# Patient Record
Sex: FEMALE | Race: WHITE | NOT HISPANIC OR LATINO | Employment: UNEMPLOYED | URBAN - METROPOLITAN AREA
[De-identification: names, ages, dates, MRNs, and addresses within clinical notes are randomized per-mention and may not be internally consistent; named-entity substitution may affect disease eponyms.]

---

## 2017-05-03 NOTE — TELEPHONE ENCOUNTER
----- Message from Amelia Clark sent at 5/3/2017  2:39 PM CDT -----  Pt states she wanted to let the office know she has an apt.          Please call pt back at [phone]

## 2017-05-03 NOTE — TELEPHONE ENCOUNTER
----- Message from Isela Baumann sent at 5/3/2017  2:03 PM CDT -----  Contact: patient  Calling concerning renewal for Verapamil 360 mg and Xanax. Please call patient @ [phone]. Thanks, anna Ashton   [address] [place] [address]   # [phone]

## 2017-05-03 NOTE — TELEPHONE ENCOUNTER
----- Message from Lizz French sent at 5/3/2017  4:45 PM CDT -----  returned call..[phone] (home)

## 2017-05-03 NOTE — TELEPHONE ENCOUNTER
Returned call to pt regarding Rx request. No answer. Advised via voicemail that she is overdue for an appointment, and should call the appointment desk to schedule.//rlt

## 2017-05-04 RX ORDER — VERAPAMIL HYDROCHLORIDE 360 MG/1
CAPSULE, DELAYED RELEASE PELLETS ORAL
Qty: 180 CAPSULE | Refills: 3 | Status: SHIPPED | OUTPATIENT
Start: 2017-05-04 | End: 2017-05-10

## 2017-05-04 RX ORDER — ALPRAZOLAM 0.5 MG/1
0.5 TABLET ORAL 3 TIMES DAILY
Qty: 60 TABLET | Refills: 0 | Status: SHIPPED | OUTPATIENT
Start: 2017-05-04 | End: 2017-05-04 | Stop reason: SDUPTHER

## 2017-05-04 RX ORDER — VERAPAMIL HYDROCHLORIDE 360 MG/1
CAPSULE, DELAYED RELEASE PELLETS ORAL
Qty: 180 CAPSULE | Refills: 3 | Status: SHIPPED | OUTPATIENT
Start: 2017-05-04 | End: 2017-05-23 | Stop reason: CLARIF

## 2017-05-04 RX ORDER — ALPRAZOLAM 0.5 MG/1
0.5 TABLET ORAL 3 TIMES DAILY
Qty: 60 TABLET | Refills: 0 | Status: SHIPPED | OUTPATIENT
Start: 2017-05-04 | End: 2017-05-23 | Stop reason: SDUPTHER

## 2017-05-04 NOTE — TELEPHONE ENCOUNTER
----- Message from Isela Baumann sent at 5/4/2017  9:22 AM CDT -----  Contact: patient  Calling concerning if RX for BP medication and Xanax medication was called in to pharmacy.    [facility] [address] [place] [address]  [address] [place] [address]  Phone: [phone] Fax: [phone]    Please call patient ASAP @ [phone]. Thanks, anna

## 2017-05-04 NOTE — TELEPHONE ENCOUNTER
Returned call to pt regarding med refills. Appt scheduled 5/10/17. Requesting Verapamil/Xanax refill, and a phone call once Rx has been sent. No longer has insurance.

## 2017-05-04 NOTE — TELEPHONE ENCOUNTER
----- Message from Isela Baumann sent at 5/4/2017  9:22 AM CDT -----  Contact: patient  Calling concerning if RX for BP medication and Xanax medication was called in to pharmacy.    [facility] - [place] [address]  [address] [place] [address]  Phone: [phone] Fax: [phone]    Please call patient ASAP @ [phone]. Thanks, anna

## 2017-05-05 ENCOUNTER — TELEPHONE (OUTPATIENT)
Dept: INTERNAL MEDICINE | Facility: CLINIC | Age: 57
End: 2017-05-05

## 2017-05-05 NOTE — TELEPHONE ENCOUNTER
I called the pt back about her appt on wed 05/10/17 and I asked was there a problem with her appt scheduled she stated no . She needs her handicap plaque renewed . I informed her I will let the physician know that she called to request a new handicap plaque.

## 2017-05-10 ENCOUNTER — OFFICE VISIT (OUTPATIENT)
Dept: INTERNAL MEDICINE | Facility: CLINIC | Age: 57
End: 2017-05-10
Payer: MEDICAID

## 2017-05-10 VITALS
OXYGEN SATURATION: 99 % | HEIGHT: 66 IN | HEART RATE: 94 BPM | TEMPERATURE: 97 F | WEIGHT: 97.44 LBS | BODY MASS INDEX: 15.66 KG/M2 | SYSTOLIC BLOOD PRESSURE: 132 MMHG | DIASTOLIC BLOOD PRESSURE: 85 MMHG

## 2017-05-10 DIAGNOSIS — M15.9 PRIMARY OSTEOARTHRITIS INVOLVING MULTIPLE JOINTS: ICD-10-CM

## 2017-05-10 DIAGNOSIS — F17.200 TOBACCO DEPENDENCE: ICD-10-CM

## 2017-05-10 DIAGNOSIS — M81.0 AGE-RELATED OSTEOPOROSIS WITHOUT CURRENT PATHOLOGICAL FRACTURE: ICD-10-CM

## 2017-05-10 DIAGNOSIS — Z00.00 WELL ADULT EXAM: Primary | ICD-10-CM

## 2017-05-10 DIAGNOSIS — I10 ESSENTIAL HYPERTENSION: ICD-10-CM

## 2017-05-10 DIAGNOSIS — F41.9 ANXIETY: ICD-10-CM

## 2017-05-10 DIAGNOSIS — K21.9 GASTROESOPHAGEAL REFLUX DISEASE WITHOUT ESOPHAGITIS: ICD-10-CM

## 2017-05-10 PROCEDURE — 99396 PREV VISIT EST AGE 40-64: CPT | Mod: S$PBB,,, | Performed by: PEDIATRICS

## 2017-05-10 PROCEDURE — 99213 OFFICE O/P EST LOW 20 MIN: CPT | Mod: PBBFAC,PO | Performed by: PEDIATRICS

## 2017-05-10 PROCEDURE — 99999 PR PBB SHADOW E&M-EST. PATIENT-LVL III: CPT | Mod: PBBFAC,,, | Performed by: PEDIATRICS

## 2017-05-10 RX ORDER — ALENDRONATE SODIUM 70 MG/1
70 TABLET ORAL
Qty: 4 TABLET | Refills: 11 | Status: SHIPPED | OUTPATIENT
Start: 2017-05-10 | End: 2017-05-23 | Stop reason: SDUPTHER

## 2017-05-10 NOTE — PROGRESS NOTES
Subjective:       Patient ID: Gayle Rhodes is a 57 y.o. female.    Chief Complaint: Annual Exam    HPI Comments: HTN: B/P good, no HTNive symptoms.  Anxiety: takes Xanax BID. Is largely under control. [place] [employer] website showed appropriate use. Is dealing with stressors of she and her  loosing their jobs.  Osteoporosis by dexascan. Still smokes, but is working with state program for smoking cessation and is on Chantix and down to 4 cigarettes a day. Had seen outside rheum and took D and fosamax for 2 months.  GERD: on PPI  DJD:Has fracture L1 and herniated disc. Has seen ortho and PT.    Review of Systems   Constitutional: Negative for fever and unexpected weight change.   HENT: Negative for congestion and rhinorrhea.    Eyes: Negative for discharge and redness.   Respiratory: Negative for cough and wheezing.    Cardiovascular: Negative for chest pain, palpitations and leg swelling.   Gastrointestinal: Negative for constipation, diarrhea and vomiting.   Endocrine: Negative for cold intolerance, heat intolerance, polydipsia, polyphagia and polyuria.   Genitourinary: Negative for decreased urine volume, difficulty urinating and menstrual problem.   Musculoskeletal: Positive for arthralgias, back pain and gait problem. Negative for joint swelling.   Skin: Negative for rash and wound.   Neurological: Negative for syncope and headaches.   Psychiatric/Behavioral: Positive for dysphoric mood. Negative for behavioral problems, self-injury, sleep disturbance and suicidal ideas. The patient is nervous/anxious.        Objective:      Physical Exam   Constitutional: She is oriented to person, place, and time. She appears well-developed and well-nourished. She is cooperative.   HENT:   Head: Normocephalic and atraumatic.   Eyes: Conjunctivae, EOM and lids are normal. Pupils are equal, round, and reactive to light.   Neck: Trachea normal and normal range of motion. Neck supple. No JVD present. Carotid bruit is not  present. No Brudzinski's sign and no Kernig's sign noted. No thyroid mass and no thyromegaly present.   Cardiovascular: Normal rate, regular rhythm, normal heart sounds and normal pulses.    No murmur heard.  Pulmonary/Chest: Effort normal and breath sounds normal. She has no wheezes. She has no rhonchi. She has no rales.   Abdominal: Soft. Normal appearance. There is no hepatosplenomegaly. There is no tenderness. There is no rebound and no CVA tenderness.   Musculoskeletal: She exhibits no edema.   DJD changes in hands, stiffly moves with back.   Lymphadenopathy:     She has no cervical adenopathy.   Neurological: She is alert and oriented to person, place, and time. She has normal strength and normal reflexes. No cranial nerve deficit or sensory deficit. Coordination and gait normal.   Skin: Skin is warm. No abrasion and no rash noted.   Psychiatric: Her speech is normal and behavior is normal. Judgment and thought content normal. Her mood appears not anxious. Cognition and memory are normal. She does not exhibit a depressed mood.   Appears anxious       Assessment:       1. Well adult exam    2. Anxiety    3. Essential hypertension    4. Age-related osteoporosis without current pathological fracture    5. Primary osteoarthritis involving multiple joints    6. Tobacco dependence    7. Gastroesophageal reflux disease without esophagitis        Plan:       Well adult exam    Anxiety    Essential hypertension    Age-related osteoporosis without current pathological fracture    Primary osteoarthritis involving multiple joints    Tobacco dependence  -     alendronate (FOSAMAX) 70 MG tablet; Take 1 tablet (70 mg total) by mouth every 7 days.  Dispense: 4 tablet; Refill: 11    Gastroesophageal reflux disease without esophagitis    Hep C and mammogram when she is able as she is between insurances. Handicap form filled for DMV. Continue verapamil and xanax. Chantix will help with mood.  Fosamax use discussed with patient.  F/U yearly.

## 2017-05-10 NOTE — MR AVS SNAPSHOT
[facility]  [address]  [place] [address]  Phone: [phone]  Fax: [phone]                  Gayle Rhodes   5/10/2017 10:00 AM   Office Visit    Description:  Female : 1960   Provider:  MEHNAZ Carolina Jr., MD   Department:  [facility]           Reason for Visit     Annual Exam           Diagnoses this Visit        Comments    Well adult exam    -  Primary     Anxiety         Essential hypertension         Age-related osteoporosis without current pathological fracture         Primary osteoarthritis involving multiple joints         Tobacco dependence         Gastroesophageal reflux disease without esophagitis                To Do List           Future Appointments        Provider Department Dept Phone    5/10/2018 10:00 AM MEHNAZ Carolina Jr., MD [facility] [phone]      Goals (5 Years of Data)     None      Follow-Up and Disposition     Return in about 1 year (around 5/10/2018).    Follow-up and Disposition History       These Medications        Disp Refills Start End    alendronate (FOSAMAX) 70 MG tablet 4 tablet 11 5/10/2017 5/10/2018    Take 1 tablet (70 mg total) by mouth every 7 days. - Oral    Pharmacy: [facility] [address] [address] [address] Ph #: [phone]         [facility]     [facility] Nurse Care Line -  Assistance  Unless otherwise directed by your provider, please contact [facility], our nurse care line that is available for  assistance.     Registered nurses in the [facility] provide: appointment scheduling, clinical advisement, health education, and other advisory services.  Call: [phone] (toll free)               Medications           Message regarding Medications     Verify the changes and/or additions to your medication regime listed below are the same as discussed with your clinician today.  If any of these changes or additions are  "incorrect, please notify your healthcare provider.        START taking these NEW medications        Refills    alendronate (FOSAMAX) 70 MG tablet 11    Sig: Take 1 tablet (70 mg total) by mouth every 7 days.    Class: Normal    Route: Oral           Verify that the below list of medications is an accurate representation of the medications you are currently taking.  If none reported, the list may be blank. If incorrect, please contact your healthcare provider. Carry this list with you in case of emergency.           Current Medications     alprazolam (XANAX) 0.5 MG tablet Take 1 tablet (0.5 mg total) by mouth 3 (three) times daily.    antipyrine-benzocaine (AURALGAN OR EQUIV) 5.4-1.4 % Drop Place 3 drops into both ears 3 (three) times daily as needed.    CALCIUM CARB/VIT D3/MINERALS (CALCIUM-VITAMIN D ORAL)     neomycin-colist-HC-thonzonium (CORTISPORIN-TC) 3.3-3-10-0.5 mg/mL DrpS Place 4 drops in ear(s) 4 times daily.    omeprazole (PRILOSEC) 20 MG capsule TAKE 1 CAPSULE BY MOUTH EVERY DAY    verapamil (VERELAN) 360 MG C24P TAKE 1 CAPSULES BY MOUTH TWICE DAILY    alendronate (FOSAMAX) 70 MG tablet Take 1 tablet (70 mg total) by mouth every 7 days.           Clinical Reference Information           Your Vitals Were     BP Pulse Temp Height    132/85 (BP Location: Left arm, Patient Position: Sitting, BP Method: Manual) 94 96.7 °F (35.9 °C) (Tympanic) 5' 6" (1.676 m)    Weight SpO2 BMI    44.2 kg (97 lb 7.1 oz) 99% 15.73 kg/m2      Blood Pressure          Most Recent Value    BP  132/85      Allergies as of 5/10/2017     Penicillins      Immunizations Administered on Date of Encounter - 5/10/2017     None      [facility] Sign-Up     Activating your [facility] account is as easy as 1-2-3!     1) Visit [email or web address], select Sign Up Now, enter this activation code and your date of birth, then select Next.  [email or web address]  Expires: 6/24/2017 10:35 AM      2) Create a username and password to use when you visit [facility] " in the future and select a security question in case you lose your password and select Next.    3) Enter your e-mail address and click Sign Up!    Additional Information  If you have questions, please e-mail [email or web address] or call [phone] to talk to our [facility] staff. Remember, [facility] is NOT to be used for urgent needs. For medical emergencies, dial 911.         Smoking Cessation     If you would like to quit smoking:   You may be eligible for free services if you are a [place] resident and started smoking cigarettes before September 1, 1988.  Call the Smoking Cessation Trust ([facility] [[email or web address]]) toll free at [phone] or [phone].   Call 7-091-QUIT-NOW if you do not meet the above criteria.   Contact us via email: [email or web address]   View our website for more information: [email or web address]        Language Assistance Services     ATTENTION: Language assistance services are available, free of charge. Please call [phone].      ATENCIÓN: Si habla español, tiene a doran disposición servicios gratuitos de asistencia lingüística. Llame al [phone].     CHÚ Ý: N?u b?n nói Ti?ng Vi?t, có các d?ch v? h? tr? ngôn ng? mi?n phí dành cho b?n. G?i s? [phone].         [facility] complies with applicable Federal civil rights laws and does not discriminate on the basis of race, color, national origin, age, disability, or sex.

## 2017-05-16 NOTE — TELEPHONE ENCOUNTER
----- Message from Kelly Amato sent at 5/16/2017 12:03 PM CDT -----  Contact: pt   Pt states that she need the nurse to call her asap regarding her blood pressure med. Pt states that she is at the pharmacy and need to discuss the doses due to her insurance.   ..[phone] (home)       ..    [facility] [place] - [address]  [address] [address]  Phone: [phone] Fax: [phone]

## 2017-05-16 NOTE — TELEPHONE ENCOUNTER
----- Message from Kelly Amato sent at 5/16/2017 12:06 PM CDT -----  Contact: pt   Pt need a refill on [employer].   ..[phone] (home)       ..    [facility] [place] - [address]  [address] [address]  Phone: [phone] Fax: [phone]

## 2017-05-16 NOTE — TELEPHONE ENCOUNTER
Returned call to pt. States that now that she has Medicaid, her insurance only covers Verapamil once daily.  Needs Prior Authorization. Also requesting refill on Xanax. Advised that Rx was just filled on 5/4, and is not due for refill at this time.     During phone conversation, pt sounds very anxious with slurred speech.

## 2017-05-17 RX ORDER — ALPRAZOLAM 0.5 MG/1
TABLET ORAL
Qty: 60 TABLET | Refills: 0 | OUTPATIENT
Start: 2017-05-17

## 2017-05-17 NOTE — TELEPHONE ENCOUNTER
Returned call to pt regarding BP meds. Advised that a prior authorization is needed, and we are awaiting a response from insurance. She verbalized understanding.//rlt

## 2017-05-17 NOTE — TELEPHONE ENCOUNTER
----- Message from Luz Maria Hughes sent at 5/17/2017  3:37 PM CDT -----  Contact: pt   States she's left a message yesterday regarding her blood pressure medicine and no one has called her back. Please call pt at [phone]. Thank you

## 2017-05-19 ENCOUNTER — TELEPHONE (OUTPATIENT)
Dept: INTERNAL MEDICINE | Facility: CLINIC | Age: 57
End: 2017-05-19

## 2017-05-19 NOTE — TELEPHONE ENCOUNTER
S/w pt and she was calling to see if we had any information pertaining to her Blood pressure medication verapamil 360mg I informed her that we have not received anything back after her PA ,but I will get with Anel on Monday to see what can be done if anything and I will also check with our pharmacy assistance dept because she will be out of medication on Tuesday. She verbalized understanding and I will get back in touch with her with status.

## 2017-05-19 NOTE — TELEPHONE ENCOUNTER
----- Message from Casie Alicia sent at 5/19/2017  1:02 PM CDT -----  Pt at [phone]//states she is still wanting for medicaid to okay a refill of her blood pressure med//[facility] in [place] has not heard anything////please call to discuss//rebekah/ambreen

## 2017-05-23 DIAGNOSIS — F17.200 TOBACCO DEPENDENCE: ICD-10-CM

## 2017-05-23 RX ORDER — ALENDRONATE SODIUM 70 MG/1
70 TABLET ORAL
Qty: 4 TABLET | Refills: 11 | Status: SHIPPED | OUTPATIENT
Start: 2017-05-23 | End: 2018-04-29 | Stop reason: SDUPTHER

## 2017-05-23 RX ORDER — AMLODIPINE BESYLATE 10 MG/1
10 TABLET ORAL DAILY
Qty: 30 TABLET | Refills: 11 | Status: SHIPPED | OUTPATIENT
Start: 2017-05-23 | End: 2018-04-29 | Stop reason: SDUPTHER

## 2017-05-23 RX ORDER — ALPRAZOLAM 0.5 MG/1
0.5 TABLET ORAL 3 TIMES DAILY
Qty: 60 TABLET | Refills: 0 | Status: SHIPPED | OUTPATIENT
Start: 2017-05-23 | End: 2017-06-22 | Stop reason: SDUPTHER

## 2017-05-23 NOTE — TELEPHONE ENCOUNTER
----- Message from Elle Robins sent at 5/23/2017  9:19 AM CDT -----  Patient states that she called yesterday to request a refill on Verapamil 350 mgs twice a day and [facility]osamax [facility]s [phone] and no one called her back.   She said she is out of these medication and needs them right a away.   Call her at [phone].                            caraballo

## 2017-05-23 NOTE — TELEPHONE ENCOUNTER
BRANDIE, about her blood pressure medication. Kristina and I have advised her for the past few days that her Verapamil script required a prior authorization that has been submitted, and we are awaiting a response. She continues to call daily stating that she is having a panic attack. I informed her that the script has been sent to the pharmacy, if she'd like to pay out of pocket for the script until we receive a response. She states that she can't afford the medication.     She asked if Dr. Carolina would consider changing her medication to one that would be covered. She also is requesting a refill on her Xanax and Fosamax, in addition to the BP med be sent to [facility] [address]. Advised that I would forward the request to Dr. Carolina for review.      Patient recently picked up Xanax Rx from [facility] following her 5/4 appt w/Dr. Carolina. I have previously advised her that a refill on this medication is not appropriate at this time. //rlt

## 2017-05-23 NOTE — TELEPHONE ENCOUNTER
We will not prescribe verapamil. Instead start amlodipine which is in same family and hopefully covered by insurance.

## 2017-06-14 ENCOUNTER — TELEPHONE (OUTPATIENT)
Dept: INTERNAL MEDICINE | Facility: CLINIC | Age: 57
End: 2017-06-14

## 2017-06-14 NOTE — TELEPHONE ENCOUNTER
Pt called in states she is having left ear pain radiating to her  Jaw and would like to have ear drops called into her pharmacy I informed her that there were two drops on file and asked which were she trying to fill she said she does not know . I informed her that a provider may want her to visit an urgent care near her to see what the state of her ear pain is she states she does not have one where she is so I stated then you can visit an ER she stated she is not going to an ER because her  is not working and she need this script now I said I will send over your request ,but if your pain is this severe please go have your ear checked at the ER she again stated no. Please advise. Thanks

## 2017-06-15 ENCOUNTER — TELEPHONE (OUTPATIENT)
Dept: INTERNAL MEDICINE | Facility: CLINIC | Age: 57
End: 2017-06-15

## 2017-06-15 RX ORDER — NEOMYCIN SULFATE, POLYMYXIN B SULFATE AND HYDROCORTISONE 10; 3.5; 1 MG/ML; MG/ML; [USP'U]/ML
3 SUSPENSION/ DROPS AURICULAR (OTIC) 4 TIMES DAILY
Qty: 10 ML | Refills: 0 | Status: SHIPPED | OUTPATIENT
Start: 2017-06-15 | End: 2017-06-25

## 2017-06-15 NOTE — TELEPHONE ENCOUNTER
Notified pt that physician did fill her cortisporin drops however the other drops auralgan is no longer available and she should be seen before use of drops to make sure the ear is not infected or cause more damage she stated she is quite sure it is not and is just paining . I reiterated that she should be seen before use she said okay and I verbalized understanding.

## 2017-06-22 DIAGNOSIS — K21.9 GASTROESOPHAGEAL REFLUX DISEASE, ESOPHAGITIS PRESENCE NOT SPECIFIED: ICD-10-CM

## 2017-06-22 RX ORDER — OMEPRAZOLE 20 MG/1
CAPSULE, DELAYED RELEASE ORAL
Qty: 30 CAPSULE | Refills: 11 | Status: SHIPPED | OUTPATIENT
Start: 2017-06-22 | End: 2018-07-02 | Stop reason: SDUPTHER

## 2017-06-22 RX ORDER — ALPRAZOLAM 0.5 MG/1
TABLET ORAL
Qty: 60 TABLET | Refills: 2 | Status: SHIPPED | OUTPATIENT
Start: 2017-06-22 | End: 2017-09-25 | Stop reason: SDUPTHER

## 2017-07-28 ENCOUNTER — TELEPHONE (OUTPATIENT)
Dept: INTERNAL MEDICINE | Facility: CLINIC | Age: 57
End: 2017-07-28

## 2017-07-28 NOTE — TELEPHONE ENCOUNTER
I returned the pts call and she stated that there was not enough supporting information sent in regards to her medical Dxs to social security so I asked that she get them to submit a request of the information being requested since we already received a release of information form from her she verbalized understanding . I told her I will inform the provider.

## 2017-07-28 NOTE — TELEPHONE ENCOUNTER
----- Message from Yvette Riley sent at 7/28/2017 11:29 AM CDT -----  Please call pt back at [phone] in regards to pt states she spoke with the social security office and they are stating that they do not have detailed info for her diagninosis or her bone density  Info.

## 2017-07-31 ENCOUNTER — TELEPHONE (OUTPATIENT)
Dept: INTERNAL MEDICINE | Facility: CLINIC | Age: 57
End: 2017-07-31

## 2017-07-31 NOTE — TELEPHONE ENCOUNTER
----- Message from Yvette Riley sent at 7/28/2017 11:24 AM CDT -----  Pt states that she spoke to social security office about the paperwork about her health and bone density scan. Please call pt pt at [phone].they need info explaining her diagnosis.

## 2017-07-31 NOTE — TELEPHONE ENCOUNTER
Returned call to patient regarding SS paperwork questions. No answer. Left message for return call.//rlt

## 2017-08-02 ENCOUNTER — TELEPHONE (OUTPATIENT)
Dept: INTERNAL MEDICINE | Facility: CLINIC | Age: 57
End: 2017-08-02

## 2017-08-02 NOTE — TELEPHONE ENCOUNTER
Returned call to pt regarding SSA paperwork. Advised that the MARY that was sent to Medical Records at the hospital so there is nothing else to be done here at the office. She states that she just spoke w/SSA and they haven't received anything. I informed her that there is no guarantee of a timeframe that SSA would receive the info they need, but the request has been sent. She verbalized understanding.//rlt

## 2017-08-02 NOTE — TELEPHONE ENCOUNTER
----- Message from Monica Friedman sent at 8/1/2017  2:50 PM CDT -----  Contact: [phone]  Pt would like to consult with nurse about social security office paperwork. In need of more information. Bone scan, Hypertension, Anxiety and medications. Please call back at [phone]. Thx. lj

## 2017-09-26 RX ORDER — ALPRAZOLAM 0.5 MG/1
TABLET ORAL
Qty: 60 TABLET | Refills: 3 | Status: SHIPPED | OUTPATIENT
Start: 2017-09-26 | End: 2017-10-23 | Stop reason: SDUPTHER

## 2017-10-23 ENCOUNTER — NURSE TRIAGE (OUTPATIENT)
Dept: ADMINISTRATIVE | Facility: CLINIC | Age: 57
End: 2017-10-23

## 2017-10-23 NOTE — TELEPHONE ENCOUNTER
----- Message from Yvette Riley sent at 10/20/2017  2:14 PM CDT -----  Pt is asking for a refill on xanax. Pt is out of town and would like to you to call it to this [facility] at this number [phone].pt can be reach at [phone] or at [phone].

## 2017-10-23 NOTE — TELEPHONE ENCOUNTER
----- Message from Sivan Henry sent at 10/23/2017 10:25 AM CDT -----  Contact: Patient  Patient called and stated that she called Friday regarding needing her Xanax to [place] sent [place] [address]. Phone [phone].    Please call her to confirm that it can be done today because this is her only day off and she needs to get it today.     She can be contacted at [phone] or [phone] cell.    Thanks,  Sivan

## 2017-10-24 ENCOUNTER — TELEPHONE (OUTPATIENT)
Dept: INTERNAL MEDICINE | Facility: CLINIC | Age: 57
End: 2017-10-24

## 2017-10-24 RX ORDER — ALPRAZOLAM 0.5 MG/1
0.5 TABLET ORAL 3 TIMES DAILY
Qty: 60 TABLET | Refills: 0 | Status: SHIPPED | OUTPATIENT
Start: 2017-10-24 | End: 2018-07-02 | Stop reason: SDUPTHER

## 2017-10-24 NOTE — TELEPHONE ENCOUNTER
"    Reason for Disposition   Caller has NON-URGENT medication question about med that PCP prescribed and triager unable to answer question    Answer Assessment - Initial Assessment Questions  1. SYMPTOMS: "Do you have any symptoms?"      I feel my anxiety starting to "act up."  2. SEVERITY: If symptoms are present, ask "Are they mild, moderate or severe?"      moderate    Protocols used: ST MEDICATION QUESTION CALL-A-AH      "

## 2017-10-24 NOTE — TELEPHONE ENCOUNTER
Reason for Disposition   Second attempt to contact family AND no contact made.  Answering service notified.    Protocols used: ST NO CONTACT OR DUPLICATE CONTACT CALL-A-AH

## 2017-10-24 NOTE — TELEPHONE ENCOUNTER
ROYCE Qureshi Jr., MD; Caity Sol Jr Staff 19 hours ago (8:47 PM)      Patient needs Xanax called into Pharmacy in [place].   [place] [phone]   Urgent need for Xanax   Patient states the pharmacy in [place] cannot get the RX to roll over to [place] because it is controlled.   Patient states she has been out of xanax for a while because she takes the xanax 3xd (Routing comment)

## 2018-02-28 RX ORDER — ALPRAZOLAM 0.5 MG/1
TABLET ORAL
Qty: 60 TABLET | Refills: 3 | Status: SHIPPED | OUTPATIENT
Start: 2018-02-28 | End: 2019-11-26 | Stop reason: SDUPTHER

## 2018-04-29 DIAGNOSIS — F17.200 TOBACCO DEPENDENCE: ICD-10-CM

## 2018-04-30 RX ORDER — ALENDRONATE SODIUM 70 MG/1
TABLET ORAL
Qty: 4 TABLET | Refills: 11 | Status: SHIPPED | OUTPATIENT
Start: 2018-04-30 | End: 2018-10-22 | Stop reason: SDUPTHER

## 2018-04-30 RX ORDER — AMLODIPINE BESYLATE 10 MG/1
TABLET ORAL
Qty: 30 TABLET | Refills: 11 | Status: SHIPPED | OUTPATIENT
Start: 2018-04-30 | End: 2018-10-22 | Stop reason: SDUPTHER

## 2018-05-07 ENCOUNTER — TELEPHONE (OUTPATIENT)
Dept: INTERNAL MEDICINE | Facility: CLINIC | Age: 58
End: 2018-05-07

## 2018-05-07 NOTE — TELEPHONE ENCOUNTER
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 She will have to reschedule her appt. Her new appt rescheduled for 06/07/18 @ 11:20a. She verbalized understanding.

## 2018-05-07 NOTE — TELEPHONE ENCOUNTER
----- Message from Corry Reilly sent at 5/7/2018  4:02 PM CDT -----  Contact: PT   Pt request call back to discuss her appointment .. [phone]

## 2018-05-14 DIAGNOSIS — Z12.39 BREAST CANCER SCREENING: ICD-10-CM

## 2018-05-21 ENCOUNTER — TELEPHONE (OUTPATIENT)
Dept: INTERNAL MEDICINE | Facility: CLINIC | Age: 58
End: 2018-05-21

## 2018-05-21 NOTE — TELEPHONE ENCOUNTER
----- Message from Corry Reilly sent at 5/21/2018  1:50 PM CDT -----  Contact: Pt   Pt request a call back to ask questions about her upcoming appointment. ..[phone] (home)

## 2018-05-21 NOTE — TELEPHONE ENCOUNTER
Returned call to pt and she stated that has a fracture on her right hand and she is seeing an ortho doc in [place]. She stated that she needs to reschedule her appt. New appt scheduled for 07/02/18 @ 10:40p with Dr. Carolina. She verbalized understanding.

## 2018-06-18 ENCOUNTER — PATIENT OUTREACH (OUTPATIENT)
Dept: ADMINISTRATIVE | Facility: HOSPITAL | Age: 58
End: 2018-06-18

## 2018-06-18 NOTE — PROGRESS NOTES
I have attempted without success to contact this patient re mammogram. No answer, Left Vm. (1st Attempt)

## 2018-07-02 ENCOUNTER — OFFICE VISIT (OUTPATIENT)
Dept: INTERNAL MEDICINE | Facility: CLINIC | Age: 58
End: 2018-07-02
Payer: MEDICAID

## 2018-07-02 VITALS
TEMPERATURE: 99 F | SYSTOLIC BLOOD PRESSURE: 108 MMHG | HEIGHT: 66 IN | OXYGEN SATURATION: 98 % | BODY MASS INDEX: 15.7 KG/M2 | DIASTOLIC BLOOD PRESSURE: 70 MMHG | HEART RATE: 104 BPM | WEIGHT: 97.69 LBS

## 2018-07-02 DIAGNOSIS — M81.0 AGE-RELATED OSTEOPOROSIS WITHOUT CURRENT PATHOLOGICAL FRACTURE: ICD-10-CM

## 2018-07-02 DIAGNOSIS — I10 ESSENTIAL HYPERTENSION: Primary | ICD-10-CM

## 2018-07-02 DIAGNOSIS — K21.9 GASTROESOPHAGEAL REFLUX DISEASE WITHOUT ESOPHAGITIS: ICD-10-CM

## 2018-07-02 DIAGNOSIS — F17.200 TOBACCO DEPENDENCE: ICD-10-CM

## 2018-07-02 DIAGNOSIS — K21.9 GASTROESOPHAGEAL REFLUX DISEASE, ESOPHAGITIS PRESENCE NOT SPECIFIED: ICD-10-CM

## 2018-07-02 DIAGNOSIS — M15.9 PRIMARY OSTEOARTHRITIS INVOLVING MULTIPLE JOINTS: ICD-10-CM

## 2018-07-02 DIAGNOSIS — Z12.11 COLON CANCER SCREENING: ICD-10-CM

## 2018-07-02 DIAGNOSIS — M81.0 SENILE OSTEOPOROSIS: ICD-10-CM

## 2018-07-02 DIAGNOSIS — Z11.59 NEED FOR HEPATITIS C SCREENING TEST: ICD-10-CM

## 2018-07-02 DIAGNOSIS — F41.9 ANXIETY: ICD-10-CM

## 2018-07-02 DIAGNOSIS — Z01.419 ENCOUNTER FOR GYNECOLOGICAL EXAMINATION WITHOUT ABNORMAL FINDING: ICD-10-CM

## 2018-07-02 PROCEDURE — 99213 OFFICE O/P EST LOW 20 MIN: CPT | Mod: PBBFAC,PO | Performed by: PEDIATRICS

## 2018-07-02 PROCEDURE — 99999 PR PBB SHADOW E&M-EST. PATIENT-LVL III: CPT | Mod: PBBFAC,,, | Performed by: PEDIATRICS

## 2018-07-02 PROCEDURE — 99215 OFFICE O/P EST HI 40 MIN: CPT | Mod: S$PBB,,, | Performed by: PEDIATRICS

## 2018-07-02 RX ORDER — ALPRAZOLAM 0.5 MG/1
0.5 TABLET ORAL 2 TIMES DAILY PRN
Qty: 60 TABLET | Refills: 5 | Status: SHIPPED | OUTPATIENT
Start: 2018-07-02 | End: 2019-11-26 | Stop reason: SDUPTHER

## 2018-07-02 RX ORDER — OMEPRAZOLE 20 MG/1
20 CAPSULE, DELAYED RELEASE ORAL DAILY
Qty: 30 CAPSULE | Refills: 11 | Status: SHIPPED | OUTPATIENT
Start: 2018-07-02 | End: 2019-11-26 | Stop reason: SDUPTHER

## 2018-07-02 NOTE — PROGRESS NOTES
Subjective:       Patient ID: Gayle Rhodes is a 58 y.o. female.    Chief Complaint: Annual Exam    HTN: B/P good, no HTNive symptoms.  Anxiety: takes Xanax BID. Is largely under control. [place]  [place] showed [place] use. Is now with [employer]  Osteoporosis by dexascan. Restarted smoking as she forgot Chantix and had been down to 4 cigarettes a day.   Had seen outside rheum and took D and fosamax.  GERD: on PPI  DJD:present  She fell at work and fractured right hand. Currently involved with hand surgery and nerve damage feared.      Review of Systems   Constitutional: Negative for fever and unexpected weight change.   HENT: Negative for congestion and rhinorrhea.    Eyes: Negative for discharge and redness.   Respiratory: Negative for cough and wheezing.    Cardiovascular: Negative for chest pain, palpitations and leg swelling.   Gastrointestinal: Negative for abdominal pain, anal bleeding, constipation, diarrhea and vomiting.   Genitourinary: Negative for decreased urine volume, difficulty urinating and menstrual problem.   Musculoskeletal: Negative for arthralgias and joint swelling.   Skin: Negative for rash and wound.   Neurological: Negative for syncope and headaches.   Psychiatric/Behavioral: Positive for dysphoric mood. Negative for behavioral problems and sleep disturbance. The patient is nervous/anxious.         Stable       Objective:      Physical Exam   Constitutional: She is oriented to person, place, and time. She appears well-developed and well-nourished. No distress.   Neck: No JVD present. No thyromegaly present.   Cardiovascular: Normal rate, regular rhythm and normal heart sounds.    No murmur heard.  Pulmonary/Chest: Effort normal and breath sounds normal. No respiratory distress. She has no wheezes. She has no rales.   Abdominal: Soft. She exhibits no distension and no mass. There is no tenderness. There is no guarding.   Musculoskeletal: She exhibits no edema.   Right hand/wrist in brace.    Lymphadenopathy:     She has no cervical adenopathy.   Neurological: She is alert and oriented to person, place, and time. No cranial nerve deficit. Coordination normal.   Skin: Capillary refill takes less than 2 seconds. No rash noted.   Psychiatric: She has a normal mood and affect. Her behavior is normal. Judgment and thought content normal.       Assessment:       1. Essential hypertension    2. Anxiety    3. Age-related osteoporosis without current pathological fracture    4. Gastroesophageal reflux disease without esophagitis    5. Tobacco dependence    6. Primary osteoarthritis involving multiple joints    7. Need for hepatitis C screening test    8. Colon cancer screening    9. Encounter for gynecological examination without abnormal finding        Plan:       Essential hypertension  -     Lipid panel; Future; Expected date: 07/02/2018  -     Basic metabolic panel; Future; Expected date: 07/02/2018    Anxiety    Age-related osteoporosis without current pathological fracture  -     Vitamin D; Future; Expected date: 07/02/2018    Gastroesophageal reflux disease without esophagitis    Tobacco dependence    Primary osteoarthritis involving multiple joints    Need for hepatitis C screening test  -     Hepatitis C antibody; Future; Expected date: 07/02/2018    Colon cancer screening  -     Case request GI: COLONOSCOPY    Encounter for gynecological examination without abnormal finding  -     Ambulatory referral to Gynecology    Maintain meds, she needs to go back to her rheumatologist. She is overdue health care issues, she thinks she can get now with her insurance. F/U yearly.

## 2018-10-01 ENCOUNTER — TELEPHONE (OUTPATIENT)
Dept: INTERNAL MEDICINE | Facility: CLINIC | Age: 58
End: 2018-10-01

## 2018-10-01 NOTE — TELEPHONE ENCOUNTER
----- Message from Erin Nguyen sent at 10/1/2018 11:13 AM CDT -----  Contact: pt  Calling in regards to please give her a call about a written statement for her employer and please advise and leave message   [phone]

## 2018-10-01 NOTE — LETTER
October 2, 2018      [facility]  [address] [place] [address]  Phone: [phone]  Fax: [phone]       Patient: Gayle Rhodes   YOB: 1960  Date of Visit: 07/02/2018    To Whom It May Concern:    Gayle Rhodes is a patient currently under my care, including her last office visit with physical exam on 07/02/2018. Due to her health history, specifically her chronic back condition, Gayle is unable to sit in low/compact-style cars and needs higher body positioning of an SUV/truck or equivalent.    Please don't hesitate to call our office if you have any questions.      Sincerely,        Dr. MEHNAZ Carolina M.D.

## 2018-10-01 NOTE — TELEPHONE ENCOUNTER
S/w pt asking for Reasonable Accommodations (RA) Letter for work ([employer]) that states she is unable to travel in [place] and per RA she needs to travel in an [place] d/t her back pain and wrist CTS. Pt states she has put in request to her surgeon that is performing her carpal tunnel sx in 2-3 mnths for letter as well but has not heard from his office. Pt asks for letter to be email to the following: [email or web address] and [email or web address].   Please advise?    LV 07/02/2018  No future visit

## 2018-10-22 DIAGNOSIS — I10 ESSENTIAL HYPERTENSION: Primary | ICD-10-CM

## 2018-10-22 DIAGNOSIS — M81.0 AGE-RELATED OSTEOPOROSIS WITHOUT CURRENT PATHOLOGICAL FRACTURE: ICD-10-CM

## 2018-10-22 DIAGNOSIS — F17.200 TOBACCO DEPENDENCE: ICD-10-CM

## 2018-10-22 RX ORDER — ALENDRONATE SODIUM 70 MG/1
TABLET ORAL
Qty: 12 TABLET | Refills: 0 | Status: SHIPPED | OUTPATIENT
Start: 2018-10-22 | End: 2019-11-26 | Stop reason: SDUPTHER

## 2018-10-22 RX ORDER — AMLODIPINE BESYLATE 10 MG/1
TABLET ORAL
Qty: 90 TABLET | Refills: 0 | Status: SHIPPED | OUTPATIENT
Start: 2018-10-22 | End: 2019-04-15 | Stop reason: SDUPTHER

## 2018-10-22 RX ORDER — TRAMADOL HYDROCHLORIDE 50 MG/1
1 TABLET ORAL EVERY 8 HOURS PRN
Refills: 0 | COMMUNITY
Start: 2018-10-08

## 2018-10-22 NOTE — TELEPHONE ENCOUNTER
Pt aware unable to fill Xanax early, a/t record pt should have remaining pills until approx 11/07/2018, unable to refill rx early due to controlled rx legal protocol.    ----- Message from Haylie Hernandez sent at 10/22/2018  4:14 PM CDT -----  Contact: self [phone]  States that per Javon Carolina would have to approve for her xanax to be filled early. States that she is leaving in the morning to go out of the country. Please call back at [phone]//thank you acc

## 2018-10-22 NOTE — TELEPHONE ENCOUNTER
----- Message from Arcelia Wallis sent at 10/22/2018  9:09 AM CDT -----  Contact: Pt   Pt called and stated she needed to speak to the nurse about her medications. She stated that she will be going to [facility] for [employer] and needs to discuss getting 90 day refills. She can be reached at [phone].    Thanks,  TF

## 2018-10-22 NOTE — TELEPHONE ENCOUNTER
S/w pt requesting #90 rx of Alendronate and Amlodipine to confirmed pharmacy since pt will be out of town d/t work for the next 2mths. Advised pt would let her know her once we received Dr. Carolina's response, pt voiced understanding.     07/02/18  Next visit due 07/2019

## 2019-02-18 ENCOUNTER — TELEPHONE (OUTPATIENT)
Dept: INTERNAL MEDICINE | Facility: CLINIC | Age: 59
End: 2019-02-18

## 2019-02-18 NOTE — TELEPHONE ENCOUNTER
Returned call to pt regarding scheduling an annual appt. Appt scheduled for 03/07/2019 @ 1:40pm. She verbalized understanding of appt date and time.

## 2019-02-18 NOTE — TELEPHONE ENCOUNTER
----- Message from Kim Medina sent at 2/18/2019  8:01 AM CST -----  Contact: Patient  Type:  Sooner Apoointment Request    Caller is requesting a sooner appointment.  Caller declined first available appointment listed below.  Caller  is requesting a message be sent to doctor.  Name of Caller:Gayle  When is the first available appointment? 3/15  Symptoms:Annual  Would the patient rather a call back or a response via [facility]? call  Best Call Back Number:[phone]  Additional Information:  Needs to have the appt on 3/7, please leave a voice mail if no answer

## 2019-03-12 RX ORDER — ALPRAZOLAM 0.5 MG/1
TABLET ORAL
Qty: 60 TABLET | Refills: 1 | Status: SHIPPED | OUTPATIENT
Start: 2019-03-12 | End: 2019-06-17 | Stop reason: SDUPTHER

## 2019-04-08 ENCOUNTER — PATIENT OUTREACH (OUTPATIENT)
Dept: ADMINISTRATIVE | Facility: HOSPITAL | Age: 59
End: 2019-04-08

## 2019-04-15 DIAGNOSIS — I10 ESSENTIAL HYPERTENSION: ICD-10-CM

## 2019-04-15 RX ORDER — AMLODIPINE BESYLATE 10 MG/1
TABLET ORAL
Qty: 90 TABLET | Refills: 0 | Status: SHIPPED | OUTPATIENT
Start: 2019-04-15 | End: 2020-11-02 | Stop reason: HOSPADM

## 2019-06-17 DIAGNOSIS — F17.200 TOBACCO DEPENDENCE: ICD-10-CM

## 2019-06-18 DIAGNOSIS — F17.200 TOBACCO DEPENDENCE: ICD-10-CM

## 2019-06-18 RX ORDER — ALPRAZOLAM 0.5 MG/1
TABLET ORAL
Qty: 60 TABLET | Refills: 0 | Status: SHIPPED | OUTPATIENT
Start: 2019-06-18 | End: 2019-11-26 | Stop reason: SDUPTHER

## 2019-06-18 RX ORDER — ALENDRONATE SODIUM 70 MG/1
TABLET ORAL
Qty: 12 TABLET | Refills: 3 | Status: SHIPPED | OUTPATIENT
Start: 2019-06-18 | End: 2019-11-26 | Stop reason: SDUPTHER

## 2019-06-18 RX ORDER — ALENDRONATE SODIUM 70 MG/1
TABLET ORAL
Qty: 4 TABLET | Refills: 0 | Status: SHIPPED | OUTPATIENT
Start: 2019-06-18 | End: 2019-06-18 | Stop reason: SDUPTHER

## 2019-06-18 NOTE — TELEPHONE ENCOUNTER
Called pt regarding refill. Advised her that Dr. Carolina would like for her to schedule a f/u and lab appt. Lab appt scheduled for 07/16/2019 and zari with Dr. Carolina scheduled for 07/17/2019 @ 2:00pm. Pt verbalized understanding.

## 2019-10-03 ENCOUNTER — PATIENT OUTREACH (OUTPATIENT)
Dept: ADMINISTRATIVE | Facility: HOSPITAL | Age: 59
End: 2019-10-03

## 2019-10-03 NOTE — PROGRESS NOTES
I have attempted without success to contact this patient re Mammogram & Annual exam. No answer, LVM.

## 2019-10-16 DIAGNOSIS — Z12.39 BREAST CANCER SCREENING: ICD-10-CM

## 2019-10-22 ENCOUNTER — PATIENT OUTREACH (OUTPATIENT)
Dept: ADMINISTRATIVE | Facility: HOSPITAL | Age: 59
End: 2019-10-22

## 2019-10-23 ENCOUNTER — TELEPHONE (OUTPATIENT)
Dept: ADMINISTRATIVE | Facility: HOSPITAL | Age: 59
End: 2019-10-23

## 2019-10-23 NOTE — TELEPHONE ENCOUNTER
Called pt, no answer, left voicemail advising pt since has not been seen in >15mths (last OV 07/02/18), pt will have to see Dr. Carolina as sched at appt 11/26/19, pt can come fasting and after appt Dr. Carolina can order what pt needs and pt can complete testing after appt.

## 2019-11-26 ENCOUNTER — OFFICE VISIT (OUTPATIENT)
Dept: INTERNAL MEDICINE | Facility: CLINIC | Age: 59
End: 2019-11-26
Payer: COMMERCIAL

## 2019-11-26 ENCOUNTER — LAB VISIT (OUTPATIENT)
Dept: LAB | Facility: HOSPITAL | Age: 59
End: 2019-11-26
Attending: PEDIATRICS
Payer: COMMERCIAL

## 2019-11-26 ENCOUNTER — TELEPHONE (OUTPATIENT)
Dept: INTERNAL MEDICINE | Facility: CLINIC | Age: 59
End: 2019-11-26

## 2019-11-26 VITALS
RESPIRATION RATE: 16 BRPM | TEMPERATURE: 99 F | WEIGHT: 102.31 LBS | BODY MASS INDEX: 16.44 KG/M2 | DIASTOLIC BLOOD PRESSURE: 72 MMHG | OXYGEN SATURATION: 97 % | HEART RATE: 56 BPM | SYSTOLIC BLOOD PRESSURE: 108 MMHG | HEIGHT: 66 IN

## 2019-11-26 DIAGNOSIS — F41.9 ANXIETY: Primary | ICD-10-CM

## 2019-11-26 DIAGNOSIS — Z00.00 WELL ADULT EXAM: ICD-10-CM

## 2019-11-26 DIAGNOSIS — Z00.00 WELL ADULT EXAM: Primary | ICD-10-CM

## 2019-11-26 DIAGNOSIS — M15.9 PRIMARY OSTEOARTHRITIS INVOLVING MULTIPLE JOINTS: ICD-10-CM

## 2019-11-26 DIAGNOSIS — K21.9 GASTROESOPHAGEAL REFLUX DISEASE, ESOPHAGITIS PRESENCE NOT SPECIFIED: ICD-10-CM

## 2019-11-26 DIAGNOSIS — M81.0 AGE-RELATED OSTEOPOROSIS WITHOUT CURRENT PATHOLOGICAL FRACTURE: ICD-10-CM

## 2019-11-26 DIAGNOSIS — I10 ESSENTIAL HYPERTENSION: ICD-10-CM

## 2019-11-26 DIAGNOSIS — F41.9 ANXIETY: ICD-10-CM

## 2019-11-26 DIAGNOSIS — K21.9 GASTROESOPHAGEAL REFLUX DISEASE WITHOUT ESOPHAGITIS: ICD-10-CM

## 2019-11-26 DIAGNOSIS — F17.200 TOBACCO DEPENDENCE: ICD-10-CM

## 2019-11-26 DIAGNOSIS — Z12.11 COLON CANCER SCREENING: ICD-10-CM

## 2019-11-26 LAB
ALBUMIN SERPL BCP-MCNC: 3.4 G/DL (ref 3.5–5.2)
ALP SERPL-CCNC: 90 U/L (ref 55–135)
ALT SERPL W/O P-5'-P-CCNC: 10 U/L (ref 10–44)
ANION GAP SERPL CALC-SCNC: 11 MMOL/L (ref 8–16)
AST SERPL-CCNC: 15 U/L (ref 10–40)
BASOPHILS # BLD AUTO: 0.03 K/UL (ref 0–0.2)
BASOPHILS NFR BLD: 0.2 % (ref 0–1.9)
BILIRUB SERPL-MCNC: 0.8 MG/DL (ref 0.1–1)
BUN SERPL-MCNC: 9 MG/DL (ref 6–20)
CALCIUM SERPL-MCNC: 8.9 MG/DL (ref 8.7–10.5)
CHLORIDE SERPL-SCNC: 103 MMOL/L (ref 95–110)
CHOLEST SERPL-MCNC: 121 MG/DL (ref 120–199)
CHOLEST/HDLC SERPL: 2 {RATIO} (ref 2–5)
CO2 SERPL-SCNC: 25 MMOL/L (ref 23–29)
CREAT SERPL-MCNC: 0.7 MG/DL (ref 0.5–1.4)
DIFFERENTIAL METHOD: ABNORMAL
EOSINOPHIL # BLD AUTO: 0 K/UL (ref 0–0.5)
EOSINOPHIL NFR BLD: 0.1 % (ref 0–8)
ERYTHROCYTE [DISTWIDTH] IN BLOOD BY AUTOMATED COUNT: 12 % (ref 11.5–14.5)
EST. GFR  (AFRICAN AMERICAN): >60 ML/MIN/1.73 M^2
EST. GFR  (NON AFRICAN AMERICAN): >60 ML/MIN/1.73 M^2
GLUCOSE SERPL-MCNC: 76 MG/DL (ref 70–110)
HCT VFR BLD AUTO: 41.9 % (ref 37–48.5)
HDLC SERPL-MCNC: 62 MG/DL (ref 40–75)
HDLC SERPL: 51.2 % (ref 20–50)
HGB BLD-MCNC: 13.4 G/DL (ref 12–16)
IMM GRANULOCYTES # BLD AUTO: 0.06 K/UL (ref 0–0.04)
IMM GRANULOCYTES NFR BLD AUTO: 0.5 % (ref 0–0.5)
LDLC SERPL CALC-MCNC: 48 MG/DL (ref 63–159)
LYMPHOCYTES # BLD AUTO: 1.5 K/UL (ref 1–4.8)
LYMPHOCYTES NFR BLD: 12.5 % (ref 18–48)
MCH RBC QN AUTO: 34.1 PG (ref 27–31)
MCHC RBC AUTO-ENTMCNC: 32 G/DL (ref 32–36)
MCV RBC AUTO: 107 FL (ref 82–98)
MONOCYTES # BLD AUTO: 1.3 K/UL (ref 0.3–1)
MONOCYTES NFR BLD: 11 % (ref 4–15)
NEUTROPHILS # BLD AUTO: 9.2 K/UL (ref 1.8–7.7)
NEUTROPHILS NFR BLD: 75.7 % (ref 38–73)
NONHDLC SERPL-MCNC: 59 MG/DL
NRBC BLD-RTO: 0 /100 WBC
PLATELET # BLD AUTO: 180 K/UL (ref 150–350)
PMV BLD AUTO: 12.3 FL (ref 9.2–12.9)
POTASSIUM SERPL-SCNC: 4.3 MMOL/L (ref 3.5–5.1)
PROT SERPL-MCNC: 6.7 G/DL (ref 6–8.4)
RBC # BLD AUTO: 3.93 M/UL (ref 4–5.4)
SODIUM SERPL-SCNC: 139 MMOL/L (ref 136–145)
TRIGL SERPL-MCNC: 55 MG/DL (ref 30–150)
TSH SERPL DL<=0.005 MIU/L-ACNC: 0.77 UIU/ML (ref 0.4–4)
WBC # BLD AUTO: 12.11 K/UL (ref 3.9–12.7)

## 2019-11-26 PROCEDURE — 90472 PNEUMOCOCCAL POLYSACCHARIDE VACCINE 23-VALENT =>2YO SQ IM: ICD-10-PCS | Mod: S$GLB,,, | Performed by: PEDIATRICS

## 2019-11-26 PROCEDURE — 80053 COMPREHEN METABOLIC PANEL: CPT

## 2019-11-26 PROCEDURE — 99999 PR PBB SHADOW E&M-EST. PATIENT-LVL IV: CPT | Mod: PBBFAC,,, | Performed by: PEDIATRICS

## 2019-11-26 PROCEDURE — 99396 PR PREVENTIVE VISIT,EST,40-64: ICD-10-PCS | Mod: 25,S$GLB,, | Performed by: PEDIATRICS

## 2019-11-26 PROCEDURE — 90732 PPSV23 VACC 2 YRS+ SUBQ/IM: CPT | Mod: S$GLB,,, | Performed by: PEDIATRICS

## 2019-11-26 PROCEDURE — 90686 FLU VACCINE (QUAD) GREATER THAN OR EQUAL TO 3YO PRESERVATIVE FREE IM: ICD-10-PCS | Mod: S$GLB,,, | Performed by: PEDIATRICS

## 2019-11-26 PROCEDURE — 90472 IMMUNIZATION ADMIN EACH ADD: CPT | Mod: S$GLB,,, | Performed by: PEDIATRICS

## 2019-11-26 PROCEDURE — 90715 TDAP VACCINE 7 YRS/> IM: CPT | Mod: S$GLB,,, | Performed by: PEDIATRICS

## 2019-11-26 PROCEDURE — 80061 LIPID PANEL: CPT

## 2019-11-26 PROCEDURE — 99396 PREV VISIT EST AGE 40-64: CPT | Mod: 25,S$GLB,, | Performed by: PEDIATRICS

## 2019-11-26 PROCEDURE — 85025 COMPLETE CBC W/AUTO DIFF WBC: CPT

## 2019-11-26 PROCEDURE — 90471 FLU VACCINE (QUAD) GREATER THAN OR EQUAL TO 3YO PRESERVATIVE FREE IM: ICD-10-PCS | Mod: S$GLB,,, | Performed by: PEDIATRICS

## 2019-11-26 PROCEDURE — 86803 HEPATITIS C AB TEST: CPT

## 2019-11-26 PROCEDURE — 90732 PNEUMOCOCCAL POLYSACCHARIDE VACCINE 23-VALENT =>2YO SQ IM: ICD-10-PCS | Mod: S$GLB,,, | Performed by: PEDIATRICS

## 2019-11-26 PROCEDURE — 90715 TDAP VACCINE GREATER THAN OR EQUAL TO 7YO IM: ICD-10-PCS | Mod: S$GLB,,, | Performed by: PEDIATRICS

## 2019-11-26 PROCEDURE — 84443 ASSAY THYROID STIM HORMONE: CPT

## 2019-11-26 PROCEDURE — 36415 COLL VENOUS BLD VENIPUNCTURE: CPT

## 2019-11-26 PROCEDURE — 90686 IIV4 VACC NO PRSV 0.5 ML IM: CPT | Mod: S$GLB,,, | Performed by: PEDIATRICS

## 2019-11-26 PROCEDURE — 99999 PR PBB SHADOW E&M-EST. PATIENT-LVL IV: ICD-10-PCS | Mod: PBBFAC,,, | Performed by: PEDIATRICS

## 2019-11-26 PROCEDURE — 90471 IMMUNIZATION ADMIN: CPT | Mod: S$GLB,,, | Performed by: PEDIATRICS

## 2019-11-26 RX ORDER — VERAPAMIL HYDROCHLORIDE 240 MG/1
240 CAPSULE, EXTENDED RELEASE ORAL 2 TIMES DAILY
COMMUNITY
End: 2019-11-26 | Stop reason: SDUPTHER

## 2019-11-26 RX ORDER — ALPRAZOLAM 0.5 MG/1
TABLET ORAL
Qty: 60 TABLET | Refills: 0 | OUTPATIENT
Start: 2019-11-26

## 2019-11-26 RX ORDER — OMEPRAZOLE 20 MG/1
20 CAPSULE, DELAYED RELEASE ORAL DAILY
Qty: 90 CAPSULE | Refills: 3 | Status: SHIPPED | OUTPATIENT
Start: 2019-11-26 | End: 2020-11-02 | Stop reason: SDUPTHER

## 2019-11-26 RX ORDER — CELECOXIB 200 MG/1
200 CAPSULE ORAL DAILY
Qty: 90 CAPSULE | Refills: 3 | Status: SHIPPED | OUTPATIENT
Start: 2019-11-26 | End: 2020-07-02

## 2019-11-26 RX ORDER — VERAPAMIL HYDROCHLORIDE 240 MG/1
240 CAPSULE, EXTENDED RELEASE ORAL 2 TIMES DAILY
Qty: 180 CAPSULE | Refills: 3 | Status: SHIPPED | OUTPATIENT
Start: 2019-11-26 | End: 2020-11-02 | Stop reason: SDUPTHER

## 2019-11-26 RX ORDER — ALPRAZOLAM 0.5 MG/1
0.5 TABLET ORAL 2 TIMES DAILY PRN
Qty: 60 TABLET | Refills: 5 | Status: SHIPPED | OUTPATIENT
Start: 2019-11-26 | End: 2020-06-11

## 2019-11-26 RX ORDER — ALENDRONATE SODIUM 70 MG/1
TABLET ORAL
Qty: 12 TABLET | Refills: 3 | Status: SHIPPED | OUTPATIENT
Start: 2019-11-26 | End: 2020-08-03

## 2019-11-26 RX ORDER — ALPRAZOLAM 0.5 MG/1
0.5 TABLET ORAL 2 TIMES DAILY PRN
Qty: 60 TABLET | Refills: 5 | Status: SHIPPED | OUTPATIENT
Start: 2019-11-26 | End: 2019-11-26 | Stop reason: SDUPTHER

## 2019-11-26 NOTE — PROGRESS NOTES
Subjective:       Patient ID: Gayle Rhodes is a 59 y.o. female.    Chief Complaint: Follow-up and Medication Refill    HTN: B/P good, no HTNive symptoms. Using verapamil  Anxiety: takes Xanax BID. Is largely under control. [place]  [place] showed [place]. Is now with [employer]  Osteoporosis by dexascan. On fosamax  Restarted smoking .   GERD: on PPI  DJD:always present to some degree  She fell at work and fractured right hand. Currently will need hand surgery and nerve damage occurred.    Review of Systems   Constitutional: Negative for fever and unexpected weight change.   HENT: Negative for congestion and rhinorrhea.    Eyes: Negative for discharge and redness.   Respiratory: Negative for cough and wheezing.    Cardiovascular: Negative for chest pain, palpitations and leg swelling.   Gastrointestinal: Negative for constipation, diarrhea and vomiting.   Genitourinary: Negative for decreased urine volume, difficulty urinating and menstrual problem.   Musculoskeletal: Positive for arthralgias, back pain, myalgias, neck pain and neck stiffness. Negative for joint swelling.        Right hand is severely affected   Skin: Negative for rash and wound.   Neurological: Negative for syncope and headaches.   Psychiatric/Behavioral: Negative for behavioral problems, dysphoric mood, self-injury, sleep disturbance and suicidal ideas. The patient is nervous/anxious.        Objective:      Physical Exam   Constitutional: She is oriented to person, place, and time. She appears well-developed and well-nourished. No distress.   Neck: No JVD present. No thyromegaly present.   Cardiovascular: Normal rate, regular rhythm and normal heart sounds.   No murmur heard.  Pulmonary/Chest: Effort normal and breath sounds normal. No respiratory distress. She has no wheezes. She has no rales.   Abdominal: Soft. She exhibits no distension and no mass. There is no tenderness. There is no guarding.   Musculoskeletal: She exhibits no edema.    Right hand in in splint   Lymphadenopathy:     She has no cervical adenopathy.   Neurological: She is alert and oriented to person, place, and time. No cranial nerve deficit. Coordination normal.   Skin: Capillary refill takes less than 2 seconds. No rash noted.   Psychiatric: She has a normal mood and affect. Her behavior is normal. Judgment and thought content normal.       Assessment:       1. Well adult exam    2. Essential hypertension    3. Anxiety    4. Age-related osteoporosis without current pathological fracture    5. Tobacco dependence    6. Gastroesophageal reflux disease without esophagitis    7. Primary osteoarthritis involving multiple joints    8. Gastroesophageal reflux disease, esophagitis presence not specified    9. Colon cancer screening        Plan:       Well adult exam  -     Lipid panel; Future; Expected date: 11/26/2019  -     Comprehensive metabolic panel; Future; Expected date: 11/26/2019  -     TSH; Future; Expected date: 11/26/2019  -     CBC auto differential; Future; Expected date: 11/26/2019  -     Hepatitis C antibody; Future; Expected date: 11/26/2019  -     Ambulatory referral to Gynecology    Essential hypertension    Anxiety    Age-related osteoporosis without current pathological fracture  -     alendronate (FOSAMAX) 70 MG tablet; TAKE 1 TABLET(70 MG) BY MOUTH EVERY 7 DAYS  Dispense: 12 tablet; Refill: 3    Tobacco dependence    Gastroesophageal reflux disease without esophagitis    Primary osteoarthritis involving multiple joints    Gastroesophageal reflux disease, esophagitis presence not specified  -     omeprazole (PRILOSEC) 20 MG capsule; Take 1 capsule (20 mg total) by mouth once daily.  Dispense: 90 capsule; Refill: 3    Colon cancer screening  -     Case request GI: COLONOSCOPY    Other orders  -     Pneumococcal Polysaccharide Vaccine (23 Valent) (SQ/IM)  -     Tdap Vaccine  -     celecoxib (CELEBREX) 200 MG capsule; Take 1 capsule (200 mg total) by mouth once daily.   Dispense: 90 capsule; Refill: 3  -     verapamil (VERELAN) 240 MG C24P; Take 1 capsule (240 mg total) by mouth 2 (two) times daily.  Dispense: 180 capsule; Refill: 3  -     ALPRAZolam (XANAX) 0.5 MG tablet; Take 1 tablet (0.5 mg total) by mouth 2 (two) times daily as needed.  Dispense: 60 tablet; Refill: 5    HM issues reviewed. She will have more free time in Feb/March to perform her HM issues. Await labs today. Stop smoking. F/U yearly.

## 2019-11-26 NOTE — TELEPHONE ENCOUNTER
S/gerry Pittman at [facility] and confirmed she could pull all rx's sent to other [facility] today, but they would need new rx for Alrpazolam rx d/t controlled. Advised her would send request to Dr. Carolina.

## 2019-11-26 NOTE — TELEPHONE ENCOUNTER
Pt advised Dr. Carolina sent Alprazolam rx to [facility] location, other rx's sent today to other  are already process by [facility]. Pt voiced understanding and to CB PRN.

## 2019-11-26 NOTE — TELEPHONE ENCOUNTER
Pt called stating she is at [facility] and they do not have Alprazolam rx. Advised pt would s/w pharmacy directly and confirm they receive e-rx transmission that was sent by Dr. Carolina, pt voiced understanding.

## 2019-11-26 NOTE — TELEPHONE ENCOUNTER
S/w Pharmacist and she confirmed she just received transmission of Alprazolam e-rx sent by Dr. Carolina today and could see in her system, and would process rx fill for pt.

## 2019-11-26 NOTE — TELEPHONE ENCOUNTER
----- Message from Raine Lester sent at 11/26/2019 11:53 AM CST -----  Contact: pt  The pt states her medication was sent to he wrong pharmacy, please sent the pt meds to [facility] ph.[phone], no additional info given and can be reached at [phone]///[id number]

## 2019-11-27 ENCOUNTER — TELEPHONE (OUTPATIENT)
Dept: ENDOSCOPY | Facility: HOSPITAL | Age: 59
End: 2019-11-27

## 2019-11-27 LAB — HCV AB SERPL QL IA: NEGATIVE

## 2019-11-29 ENCOUNTER — TELEPHONE (OUTPATIENT)
Dept: INTERNAL MEDICINE | Facility: CLINIC | Age: 59
End: 2019-11-29

## 2019-11-29 RX ORDER — METHYLPREDNISOLONE 4 MG/1
TABLET ORAL
Qty: 1 PACKAGE | Refills: 0 | Status: SHIPPED | OUTPATIENT
Start: 2019-11-29 | End: 2019-12-20

## 2019-11-29 RX ORDER — DOXYCYCLINE 100 MG/1
100 CAPSULE ORAL EVERY 12 HOURS
Qty: 20 CAPSULE | Refills: 0 | Status: SHIPPED | OUTPATIENT
Start: 2019-11-29 | End: 2019-12-09

## 2019-11-29 NOTE — TELEPHONE ENCOUNTER
S/w pt c/o productive cough since LV 11/26/19 w/ mild nasal congestion, pt denies SOB, sore throat, or fever. Pt confirmed she is taking OTC Mucinex 12hr 2tabs BID w/ little relief in persistent cough. Pt requesting dose pack and Cipro abx rx, stating she was tx w/ Cipro for Bronchitis back in June and does not want recurrent. Advised pt would send request to provider in clinic, and let pt know when we receive their response. Pt voiced understanding.

## 2019-12-05 ENCOUNTER — TELEPHONE (OUTPATIENT)
Dept: ENDOSCOPY | Facility: HOSPITAL | Age: 59
End: 2019-12-05

## 2019-12-05 NOTE — TELEPHONE ENCOUNTER
Attempted to schedule procedure. Spoke with pt and she will be in [place] until March 2020. She will call when she gets back. helen

## 2020-02-03 ENCOUNTER — PATIENT OUTREACH (OUTPATIENT)
Dept: ADMINISTRATIVE | Facility: HOSPITAL | Age: 60
End: 2020-02-03

## 2020-06-11 DIAGNOSIS — F41.9 ANXIETY: ICD-10-CM

## 2020-06-11 RX ORDER — ALPRAZOLAM 0.5 MG/1
TABLET ORAL
Qty: 60 TABLET | Refills: 4 | Status: SHIPPED | OUTPATIENT
Start: 2020-06-11 | End: 2020-11-02 | Stop reason: SDUPTHER

## 2020-06-29 ENCOUNTER — TELEPHONE (OUTPATIENT)
Dept: INTERNAL MEDICINE | Facility: CLINIC | Age: 60
End: 2020-06-29

## 2020-06-29 NOTE — TELEPHONE ENCOUNTER
----- Message from Peg Dawson sent at 6/29/2020  1:33 PM CDT -----  Regarding: [facility]  .Type:  Pharmacy Calling to Clarify an RX    Name of Caller: pt   Pharmacy Name: [facility]   Prescription Name: celecoxib 200 mg   What do they need to clarify?: needing a Prior Authorization   Best Call Back Number: [phone]  Additional Information:  contact pt after PA sent to pharmacy

## 2020-06-30 ENCOUNTER — TELEPHONE (OUTPATIENT)
Dept: INTERNAL MEDICINE | Facility: CLINIC | Age: 60
End: 2020-06-30

## 2020-06-30 NOTE — TELEPHONE ENCOUNTER
----- Message from Mayi Giang sent at 6/30/2020  3:00 PM CDT -----  Regarding: medication  Contact: Padmaja  Type:  Pharmacy Calling to Clarify an RX    Name of Caller:Padmaja  Pharmacy Name:  [id number] - [place] - [address] AT [facility] [address]  [address] [place] [address]  Phone: [phone] Fax: [phone]  Prescription Name:celecoxib (CELEBREX) 200 MG capsule  What do they need to clarify?:auth is not going through with insurance and pharmacy wanted to know if there is a reference number so she can check on auth for medication   Best Call Back Number:[phone]  Additional Information:

## 2020-06-30 NOTE — TELEPHONE ENCOUNTER
Returned call to pharmacy again (1st call made on 06/29/20 and unable to reach pharm staff), no current PA or PA request made on pt's Celebrex/Celecoxib rx. S/w Padmaja w/ [facility] stating she rec'vd call from [pt's ins stating rx should go thru, but when she tried rx as generic today rx denied. I advised her to run rx as Brand, but she confirmed when she ran while on phone Brand also denied. Advised Padmaja would put in PA request file, and once PA requested to ins and we receive their response, we would let pharm and pt know ins response. She voiced understanding.

## 2020-07-02 ENCOUNTER — TELEPHONE (OUTPATIENT)
Dept: INTERNAL MEDICINE | Facility: CLINIC | Age: 60
End: 2020-07-02

## 2020-07-02 RX ORDER — MELOXICAM 7.5 MG/1
7.5 TABLET ORAL DAILY
Qty: 90 TABLET | Refills: 0 | Status: SHIPPED | OUTPATIENT
Start: 2020-07-02 | End: 2020-09-28

## 2020-07-02 NOTE — TELEPHONE ENCOUNTER
PA DENIAL fax rec'vd from pt's insurance stating Celecoxib 200mg caps rx was denied since rx not covered under pt's benefits, non-formulary, and pt has not tried and failed the following: Meloxicam, Naproxen, IBU, Diclofenac tabs, and/or Sulindac tabs. Please advise?

## 2020-07-07 NOTE — TELEPHONE ENCOUNTER
Advised pt new rx for Meloxicam sent in place of Celecoxib since not covered ny ins, pt voiced understanding confirming she picked up meloxicam rx but only in #30ct. Advised pt rx was written in #90 ct, pt needs to contact pharm to confirm why they only gave pt 1/3 ct of rx. Pt voiced understanding, confirmed instrx to f/u with pharm, and to CB PRN.

## 2020-07-31 DIAGNOSIS — Z00.00 WELL ADULT EXAM: Primary | ICD-10-CM

## 2020-07-31 DIAGNOSIS — M81.0 AGE-RELATED OSTEOPOROSIS WITHOUT CURRENT PATHOLOGICAL FRACTURE: ICD-10-CM

## 2020-08-03 RX ORDER — ALENDRONATE SODIUM 70 MG/1
TABLET ORAL
Qty: 12 TABLET | Refills: 3 | Status: SHIPPED | OUTPATIENT
Start: 2020-08-03 | End: 2021-09-29

## 2020-08-18 ENCOUNTER — TELEPHONE (OUTPATIENT)
Dept: INTERNAL MEDICINE | Facility: CLINIC | Age: 60
End: 2020-08-18

## 2020-08-18 NOTE — TELEPHONE ENCOUNTER
S/w pt stating her pharm has sent fax requesting PA for Verapamil rx BID dose since pt's [facility] Medicaid ins does not cover rx. Advised pt would pt PA request in PA queue, and let pt know once PA filed and response rec'vd from pt's ins. Pt voiced understanding.

## 2020-08-18 NOTE — TELEPHONE ENCOUNTER
----- Message from Bertha Serrano sent at 8/17/2020  5:42 PM CDT -----  Regarding: patient advice  Contact: patient  Pt called in regards to to getting forms completed for medication     verapamil (VERELAN) 240 MG C24P 180 capsule 3 11/26/2019  No  Sig - Route: Take 1 capsule (240 mg total) by mouth 2 (two) times daily. - Oral  Sent to pharmacy as: verapamil (VERELAN) 240 MG C24P  Class: Normal  Order: [id number]  Date/Time Signed: 11/26/2019 10:22      E-Prescribing Status: Receipt confirmed by pharmacy (11/26/2019 10:23 AM     Pt can be reached at [phone]

## 2020-08-25 ENCOUNTER — TELEPHONE (OUTPATIENT)
Dept: INTERNAL MEDICINE | Facility: CLINIC | Age: 60
End: 2020-08-25

## 2020-08-25 NOTE — TELEPHONE ENCOUNTER
Per fax request rec'vd from pt's pharm, initiated prior auth request to pt's ins for Verapamil SR 240mg BID dosing #180 rx, submitted online via [email or web address] Request [id number]; returned fax to pt's pharm notifying them PA requested and awaiting ins response, fax transmission confirmed F#[phone].

## 2020-08-28 ENCOUNTER — TELEPHONE (OUTPATIENT)
Dept: INTERNAL MEDICINE | Facility: CLINIC | Age: 60
End: 2020-08-28

## 2020-08-28 NOTE — TELEPHONE ENCOUNTER
PA DENIAL fax rec'vd from pt's insurance for Verapamil ER 240mg capsule rx, stating in letter PA Denied since available tablet form of rx covered under pt's ins. Called pt's pharmacy, advised Pharmacist ok to change rx from capsule to tablet for ins coverage, was on hold w/ Pharmacist >5min, then they hung up.

## 2020-08-31 NOTE — TELEPHONE ENCOUNTER
S/w Mariah at [facility], she confirmed they updated Verapamil ER 240mg rx to tablets which was covered under pt's ins and pt already picked up rx.

## 2020-11-02 ENCOUNTER — OFFICE VISIT (OUTPATIENT)
Dept: INTERNAL MEDICINE | Facility: CLINIC | Age: 60
End: 2020-11-02
Payer: MEDICAID

## 2020-11-02 ENCOUNTER — LAB VISIT (OUTPATIENT)
Dept: LAB | Facility: HOSPITAL | Age: 60
End: 2020-11-02
Attending: PEDIATRICS
Payer: MEDICAID

## 2020-11-02 VITALS
RESPIRATION RATE: 16 BRPM | HEIGHT: 66 IN | DIASTOLIC BLOOD PRESSURE: 78 MMHG | OXYGEN SATURATION: 98 % | TEMPERATURE: 98 F | BODY MASS INDEX: 16.59 KG/M2 | WEIGHT: 103.19 LBS | HEART RATE: 68 BPM | SYSTOLIC BLOOD PRESSURE: 122 MMHG

## 2020-11-02 DIAGNOSIS — I10 ESSENTIAL HYPERTENSION: ICD-10-CM

## 2020-11-02 DIAGNOSIS — Z00.00 WELL ADULT EXAM: Primary | ICD-10-CM

## 2020-11-02 DIAGNOSIS — F41.9 ANXIETY: ICD-10-CM

## 2020-11-02 DIAGNOSIS — Z12.11 COLON CANCER SCREENING: ICD-10-CM

## 2020-11-02 DIAGNOSIS — M15.9 PRIMARY OSTEOARTHRITIS INVOLVING MULTIPLE JOINTS: ICD-10-CM

## 2020-11-02 DIAGNOSIS — F17.200 TOBACCO DEPENDENCE: ICD-10-CM

## 2020-11-02 DIAGNOSIS — K21.9 GASTROESOPHAGEAL REFLUX DISEASE WITHOUT ESOPHAGITIS: ICD-10-CM

## 2020-11-02 DIAGNOSIS — M81.0 AGE-RELATED OSTEOPOROSIS WITHOUT CURRENT PATHOLOGICAL FRACTURE: ICD-10-CM

## 2020-11-02 DIAGNOSIS — Z00.00 WELL ADULT EXAM: ICD-10-CM

## 2020-11-02 DIAGNOSIS — K21.9 GASTROESOPHAGEAL REFLUX DISEASE: ICD-10-CM

## 2020-11-02 LAB
ALBUMIN SERPL BCP-MCNC: 4.2 G/DL (ref 3.5–5.2)
ALP SERPL-CCNC: 72 U/L (ref 55–135)
ALT SERPL W/O P-5'-P-CCNC: 39 U/L (ref 10–44)
ANION GAP SERPL CALC-SCNC: 11 MMOL/L (ref 8–16)
AST SERPL-CCNC: 56 U/L (ref 10–40)
BASOPHILS # BLD AUTO: 0.05 K/UL (ref 0–0.2)
BASOPHILS NFR BLD: 1 % (ref 0–1.9)
BILIRUB SERPL-MCNC: 0.5 MG/DL (ref 0.1–1)
BUN SERPL-MCNC: 7 MG/DL (ref 6–20)
CALCIUM SERPL-MCNC: 9.1 MG/DL (ref 8.7–10.5)
CHLORIDE SERPL-SCNC: 103 MMOL/L (ref 95–110)
CHOLEST SERPL-MCNC: 201 MG/DL (ref 120–199)
CHOLEST/HDLC SERPL: 2.2 {RATIO} (ref 2–5)
CO2 SERPL-SCNC: 27 MMOL/L (ref 23–29)
CREAT SERPL-MCNC: 0.8 MG/DL (ref 0.5–1.4)
DIFFERENTIAL METHOD: ABNORMAL
EOSINOPHIL # BLD AUTO: 0.1 K/UL (ref 0–0.5)
EOSINOPHIL NFR BLD: 1.2 % (ref 0–8)
ERYTHROCYTE [DISTWIDTH] IN BLOOD BY AUTOMATED COUNT: 13.4 % (ref 11.5–14.5)
EST. GFR  (AFRICAN AMERICAN): >60 ML/MIN/1.73 M^2
EST. GFR  (NON AFRICAN AMERICAN): >60 ML/MIN/1.73 M^2
GLUCOSE SERPL-MCNC: 63 MG/DL (ref 70–110)
HCT VFR BLD AUTO: 43.4 % (ref 37–48.5)
HDLC SERPL-MCNC: 90 MG/DL (ref 40–75)
HDLC SERPL: 44.8 % (ref 20–50)
HGB BLD-MCNC: 14.3 G/DL (ref 12–16)
IMM GRANULOCYTES # BLD AUTO: 0.01 K/UL (ref 0–0.04)
IMM GRANULOCYTES NFR BLD AUTO: 0.2 % (ref 0–0.5)
LDLC SERPL CALC-MCNC: 96 MG/DL (ref 63–159)
LYMPHOCYTES # BLD AUTO: 1.8 K/UL (ref 1–4.8)
LYMPHOCYTES NFR BLD: 36.4 % (ref 18–48)
MCH RBC QN AUTO: 35.1 PG (ref 27–31)
MCHC RBC AUTO-ENTMCNC: 32.9 G/DL (ref 32–36)
MCV RBC AUTO: 107 FL (ref 82–98)
MONOCYTES # BLD AUTO: 0.4 K/UL (ref 0.3–1)
MONOCYTES NFR BLD: 8.1 % (ref 4–15)
NEUTROPHILS # BLD AUTO: 2.7 K/UL (ref 1.8–7.7)
NEUTROPHILS NFR BLD: 53.1 % (ref 38–73)
NONHDLC SERPL-MCNC: 111 MG/DL
NRBC BLD-RTO: 0 /100 WBC
PLATELET # BLD AUTO: 177 K/UL (ref 150–350)
PMV BLD AUTO: 11.3 FL (ref 9.2–12.9)
POTASSIUM SERPL-SCNC: 5.2 MMOL/L (ref 3.5–5.1)
PROT SERPL-MCNC: 6.7 G/DL (ref 6–8.4)
RBC # BLD AUTO: 4.07 M/UL (ref 4–5.4)
SODIUM SERPL-SCNC: 141 MMOL/L (ref 136–145)
TRIGL SERPL-MCNC: 75 MG/DL (ref 30–150)
TSH SERPL DL<=0.005 MIU/L-ACNC: 0.53 UIU/ML (ref 0.4–4)
WBC # BLD AUTO: 5.06 K/UL (ref 3.9–12.7)

## 2020-11-02 PROCEDURE — 99214 OFFICE O/P EST MOD 30 MIN: CPT | Mod: PBBFAC,25 | Performed by: PEDIATRICS

## 2020-11-02 PROCEDURE — 90472 IMMUNIZATION ADMIN EACH ADD: CPT | Mod: PBBFAC

## 2020-11-02 PROCEDURE — 99396 PREV VISIT EST AGE 40-64: CPT | Mod: S$PBB,,, | Performed by: PEDIATRICS

## 2020-11-02 PROCEDURE — 90471 IMMUNIZATION ADMIN: CPT | Mod: PBBFAC

## 2020-11-02 PROCEDURE — 80053 COMPREHEN METABOLIC PANEL: CPT

## 2020-11-02 PROCEDURE — 99999 PR PBB SHADOW E&M-EST. PATIENT-LVL IV: ICD-10-PCS | Mod: PBBFAC,,, | Performed by: PEDIATRICS

## 2020-11-02 PROCEDURE — 36415 COLL VENOUS BLD VENIPUNCTURE: CPT

## 2020-11-02 PROCEDURE — 80061 LIPID PANEL: CPT

## 2020-11-02 PROCEDURE — 99396 PR PREVENTIVE VISIT,EST,40-64: ICD-10-PCS | Mod: S$PBB,,, | Performed by: PEDIATRICS

## 2020-11-02 PROCEDURE — 99999 PR PBB SHADOW E&M-EST. PATIENT-LVL IV: CPT | Mod: PBBFAC,,, | Performed by: PEDIATRICS

## 2020-11-02 PROCEDURE — 85025 COMPLETE CBC W/AUTO DIFF WBC: CPT

## 2020-11-02 PROCEDURE — 84443 ASSAY THYROID STIM HORMONE: CPT

## 2020-11-02 RX ORDER — ALPRAZOLAM 0.5 MG/1
0.5 TABLET ORAL 2 TIMES DAILY PRN
Qty: 60 TABLET | Refills: 5 | Status: SHIPPED | OUTPATIENT
Start: 2020-11-02 | End: 2021-05-14 | Stop reason: SDUPTHER

## 2020-11-02 RX ORDER — CELECOXIB 200 MG/1
200 CAPSULE ORAL DAILY
Qty: 90 CAPSULE | Refills: 3 | Status: SHIPPED | OUTPATIENT
Start: 2020-11-02 | End: 2020-12-08

## 2020-11-02 RX ORDER — OMEPRAZOLE 20 MG/1
20 CAPSULE, DELAYED RELEASE ORAL DAILY
Qty: 90 CAPSULE | Refills: 3 | Status: SHIPPED | OUTPATIENT
Start: 2020-11-02 | End: 2021-09-29

## 2020-11-02 RX ORDER — VERAPAMIL HYDROCHLORIDE 240 MG/1
240 CAPSULE, EXTENDED RELEASE ORAL 2 TIMES DAILY
Qty: 180 CAPSULE | Refills: 3 | Status: SHIPPED | OUTPATIENT
Start: 2020-11-02 | End: 2021-11-17

## 2020-11-02 NOTE — PROGRESS NOTES
Subjective:       Patient ID: Gayle Rhodes is a 60 y.o. female.     Chief Complaint: Follow-up and Medication Refill    HTN: B/P good, no HTNive symptoms. Using verapamil  Anxiety: takes Xanax BID. Is largely under control. [place]  [place] showed [place] use. Is now with [employer]  Osteoporosis by dexascan. On fosamax  Still smoking .   GERD: on PPI  DJD:always present to some degree  Still with complications from fractured right hand. Still needing hand surgery and nerve damage occurred.     Review of Systems   Constitutional: Negative for fever and unexpected weight change.   HENT: Negative for congestion and rhinorrhea.    Eyes: Negative for discharge and redness.   Respiratory: Negative for cough and wheezing.    Cardiovascular: Negative for chest pain, palpitations and leg swelling.   Gastrointestinal: Negative for constipation, diarrhea and vomiting.   Genitourinary: Negative for decreased urine volume, difficulty urinating and menstrual problem.   Musculoskeletal: Positive for arthralgias, back pain, myalgias, neck pain and neck stiffness. Negative for joint swelling.        Right hand is severely affected   Skin: Negative for rash and wound.   Neurological: Negative for syncope and headaches.   Psychiatric/Behavioral: Negative for behavioral problems, dysphoric mood, self-injury, sleep disturbance and suicidal ideas. The patient is nervous/anxious.        Objective:      Physical Exam   Constitutional: She is oriented to person, place, and time. She appears well-developed and well-nourished. No distress.   Neck: No JVD present. No thyromegaly present.   Cardiovascular: Normal rate, regular rhythm and normal heart sounds.   No murmur heard.  Pulmonary/Chest: Effort normal and breath sounds normal. No respiratory distress. She has no wheezes. She has no rales.   Abdominal: Soft. She exhibits no distension and no mass. There is no tenderness. There is no guarding.   Musculoskeletal: She exhibits no edema.    Right hand in in splint   Lymphadenopathy:     She has no cervical adenopathy.   Neurological: She is alert and oriented to person, place, and time. No cranial nerve deficit. Coordination normal.   Skin: Capillary refill takes less than 2 seconds. No rash noted.   Psychiatric: She has a normal mood and affect. Her behavior is normal. Judgment and thought content normal.       Assessment:       1. Well adult exam    2. Essential hypertension    3. Anxiety    4. Age-related osteoporosis without current pathological fracture    5. Tobacco dependence    6. Gastroesophageal reflux disease without esophagitis    7. Primary osteoarthritis involving multiple joints    8. Gastroesophageal reflux disease, esophagitis presence not specified    9. Colon cancer screening        Plan:        issues reviewed. Colonoscopy, mammogram, dexascan, GYN due. Await labs today. Stop smoking. F/U yearly. Flu and PCV 13 now. shingrix at pharmacy.

## 2020-11-03 ENCOUNTER — TELEPHONE (OUTPATIENT)
Dept: INTERNAL MEDICINE | Facility: CLINIC | Age: 60
End: 2020-11-03

## 2020-11-10 ENCOUNTER — TELEPHONE (OUTPATIENT)
Dept: INTERNAL MEDICINE | Facility: CLINIC | Age: 60
End: 2020-11-10

## 2020-11-10 NOTE — TELEPHONE ENCOUNTER
Per e-request rec'vd from pt's pharm, initiated prior auth request to pt's ins for Verapamil 240mg rx #180/90, submitted online via [employer] Request KEY [id number].

## 2020-11-18 ENCOUNTER — TELEPHONE (OUTPATIENT)
Dept: INTERNAL MEDICINE | Facility: CLINIC | Age: 60
End: 2020-11-18

## 2020-11-18 NOTE — TELEPHONE ENCOUNTER
LATE ENTRY 11/10/20: Fax PA APPROVAL rec'vd from pt's ins for Verapamil ER 240mg cap rx, DOS 11/10/2020 - 11/10/2021, faxed Approval to pt's pharm instrc them to fill pt's rx and notify pt when rx ready, fax transmission confirmed F#[phone].

## 2020-11-25 ENCOUNTER — TELEPHONE (OUTPATIENT)
Dept: INTERNAL MEDICINE | Facility: CLINIC | Age: 60
End: 2020-11-25

## 2020-11-25 NOTE — TELEPHONE ENCOUNTER
Per fax request rec'vd from pt's pharm, initiated prior auth request to pt's ins for Celecoxib 200mg rx #90/90, submitted online via [email or web address] Request [id number]; returned fax to pt's pharm notifying them PA requested and awaiting on pt's ins.

## 2020-11-30 ENCOUNTER — TELEPHONE (OUTPATIENT)
Dept: INTERNAL MEDICINE | Facility: CLINIC | Age: 60
End: 2020-11-30

## 2020-11-30 RX ORDER — VARENICLINE TARTRATE 0.5 (11)-1
KIT ORAL
Qty: 1 PACKAGE | Refills: 0 | Status: SHIPPED | OUTPATIENT
Start: 2020-11-30 | End: 2021-06-10 | Stop reason: DRUGHIGH

## 2020-11-30 NOTE — TELEPHONE ENCOUNTER
----- Message from Marlys Virgen sent at 11/30/2020  3:45 PM CST -----  .Type:  Patient Returning Call    Who Called:pt  Who Left Message for Patient:Carlos  Does the patient know what this is regarding?:yes  Would the patient rather a call back or a response via [facility]? Call back  Best Call Back Number:[phone]  Additional Information:

## 2020-11-30 NOTE — TELEPHONE ENCOUNTER
I called the pt and informed the pt that her chantix was sent to the pharmacy by  she verbalized understanding. //kah

## 2020-11-30 NOTE — TELEPHONE ENCOUNTER
----- Message from Yin Sullivan sent at 11/30/2020 11:37 AM CST -----  Regarding: call back  Contact: [phone]  Type:  Patient Call Back    Who Called:pt   What this is regarding?: starter pack called in   Would the patient rather a call back or a response via [facility]? Call back  Best Call Back Number:[phone]  Additional Information:     Advised to call back directly if there are further questions, or if these symptoms fail to improve as anticipated or worsen.

## 2020-12-08 ENCOUNTER — TELEPHONE (OUTPATIENT)
Dept: INTERNAL MEDICINE | Facility: CLINIC | Age: 60
End: 2020-12-08

## 2020-12-08 RX ORDER — MELOXICAM 15 MG/1
15 TABLET ORAL DAILY
Qty: 30 TABLET | Refills: 3 | Status: SHIPPED | OUTPATIENT
Start: 2020-12-08 | End: 2021-06-17

## 2020-12-08 NOTE — TELEPHONE ENCOUNTER
Fax rec'vd from pt's insurance stating PA requested on Celecoxib 200mg caps rx DENIED d/t rx not covered under pt's benefits and pt has not met ins criteria, no alternatives listed on denial. Please advise alternative rx?

## 2020-12-11 ENCOUNTER — TELEPHONE (OUTPATIENT)
Dept: INTERNAL MEDICINE | Facility: CLINIC | Age: 60
End: 2020-12-11

## 2020-12-11 DIAGNOSIS — Z00.00 WELL ADULT EXAM: ICD-10-CM

## 2020-12-11 DIAGNOSIS — Z12.31 BREAST CANCER SCREENING BY MAMMOGRAM: Primary | ICD-10-CM

## 2020-12-11 NOTE — TELEPHONE ENCOUNTER
New order needed for pt's MMG, pt needs Diagnostic order since h/o BL breast augmentation. Order sent to Dr. Carolina for auth.

## 2020-12-14 ENCOUNTER — TELEPHONE (OUTPATIENT)
Dept: ENDOSCOPY | Facility: HOSPITAL | Age: 60
End: 2020-12-14

## 2020-12-14 NOTE — TELEPHONE ENCOUNTER
Attempted to contact patient to schedule colonoscopy procedure, no answer. Left message to call office, number provided. Patient not on portal.

## 2021-01-06 ENCOUNTER — TELEPHONE (OUTPATIENT)
Dept: INTERNAL MEDICINE | Facility: CLINIC | Age: 61
End: 2021-01-06

## 2021-01-27 ENCOUNTER — TELEPHONE (OUTPATIENT)
Dept: INTERNAL MEDICINE | Facility: CLINIC | Age: 61
End: 2021-01-27

## 2021-02-22 ENCOUNTER — TELEPHONE (OUTPATIENT)
Dept: ADMINISTRATIVE | Facility: HOSPITAL | Age: 61
End: 2021-02-22

## 2021-03-05 ENCOUNTER — TELEPHONE (OUTPATIENT)
Dept: ENDOSCOPY | Facility: HOSPITAL | Age: 61
End: 2021-03-05

## 2021-05-07 ENCOUNTER — PATIENT OUTREACH (OUTPATIENT)
Dept: ADMINISTRATIVE | Facility: HOSPITAL | Age: 61
End: 2021-05-07

## 2021-05-10 ENCOUNTER — PATIENT OUTREACH (OUTPATIENT)
Dept: ADMINISTRATIVE | Facility: HOSPITAL | Age: 61
End: 2021-05-10

## 2021-05-14 ENCOUNTER — TELEPHONE (OUTPATIENT)
Dept: INTERNAL MEDICINE | Facility: CLINIC | Age: 61
End: 2021-05-14

## 2021-05-14 DIAGNOSIS — F41.9 ANXIETY: ICD-10-CM

## 2021-05-14 DIAGNOSIS — M81.0 AGE-RELATED OSTEOPOROSIS WITHOUT CURRENT PATHOLOGICAL FRACTURE: ICD-10-CM

## 2021-05-17 RX ORDER — ALPRAZOLAM 0.5 MG/1
0.5 TABLET ORAL 2 TIMES DAILY PRN
Qty: 60 TABLET | Refills: 5 | Status: SHIPPED | OUTPATIENT
Start: 2021-05-17 | End: 2021-11-17 | Stop reason: SDUPTHER

## 2021-06-10 DIAGNOSIS — F17.200 TOBACCO DEPENDENCE: Primary | ICD-10-CM

## 2021-06-10 RX ORDER — VARENICLINE TARTRATE 1 MG/1
1 TABLET, FILM COATED ORAL 2 TIMES DAILY
Qty: 60 TABLET | Refills: 10 | Status: SHIPPED | OUTPATIENT
Start: 2021-06-10 | End: 2021-12-06 | Stop reason: SDUPTHER

## 2021-08-27 ENCOUNTER — TELEPHONE (OUTPATIENT)
Dept: INTERNAL MEDICINE | Facility: CLINIC | Age: 61
End: 2021-08-27

## 2021-09-02 ENCOUNTER — TELEPHONE (OUTPATIENT)
Dept: INTERNAL MEDICINE | Facility: CLINIC | Age: 61
End: 2021-09-02

## 2021-09-10 ENCOUNTER — TELEPHONE (OUTPATIENT)
Dept: INTERNAL MEDICINE | Facility: CLINIC | Age: 61
End: 2021-09-10

## 2021-09-10 RX ORDER — DOXYCYCLINE HYCLATE 100 MG
100 TABLET ORAL 2 TIMES DAILY
Qty: 20 TABLET | Refills: 0 | Status: SHIPPED | OUTPATIENT
Start: 2021-09-10 | End: 2021-09-20

## 2021-09-10 RX ORDER — METHYLPREDNISOLONE 4 MG/1
TABLET ORAL
Qty: 1 PACKAGE | Refills: 0 | Status: SHIPPED | OUTPATIENT
Start: 2021-09-10 | End: 2021-12-06

## 2021-09-28 DIAGNOSIS — M81.0 AGE-RELATED OSTEOPOROSIS WITHOUT CURRENT PATHOLOGICAL FRACTURE: ICD-10-CM

## 2021-09-28 DIAGNOSIS — K21.9 GASTROESOPHAGEAL REFLUX DISEASE: ICD-10-CM

## 2021-09-29 RX ORDER — OMEPRAZOLE 20 MG/1
CAPSULE, DELAYED RELEASE ORAL
Qty: 90 CAPSULE | Refills: 3 | Status: SHIPPED | OUTPATIENT
Start: 2021-09-29 | End: 2021-12-06 | Stop reason: SDUPTHER

## 2021-09-29 RX ORDER — ALENDRONATE SODIUM 70 MG/1
TABLET ORAL
Qty: 12 TABLET | Refills: 3 | Status: SHIPPED | OUTPATIENT
Start: 2021-09-29 | End: 2021-11-17 | Stop reason: SDUPTHER

## 2021-11-18 ENCOUNTER — PATIENT OUTREACH (OUTPATIENT)
Dept: ADMINISTRATIVE | Facility: HOSPITAL | Age: 61
End: 2021-11-18
Payer: MEDICAID

## 2021-12-06 ENCOUNTER — OFFICE VISIT (OUTPATIENT)
Dept: INTERNAL MEDICINE | Facility: CLINIC | Age: 61
End: 2021-12-06
Payer: MEDICAID

## 2021-12-06 VITALS
BODY MASS INDEX: 16.01 KG/M2 | DIASTOLIC BLOOD PRESSURE: 68 MMHG | OXYGEN SATURATION: 99 % | HEART RATE: 80 BPM | SYSTOLIC BLOOD PRESSURE: 100 MMHG | WEIGHT: 99.19 LBS | RESPIRATION RATE: 18 BRPM | TEMPERATURE: 97 F

## 2021-12-06 DIAGNOSIS — F17.200 TOBACCO DEPENDENCE: ICD-10-CM

## 2021-12-06 DIAGNOSIS — F41.9 ANXIETY: ICD-10-CM

## 2021-12-06 DIAGNOSIS — Z12.11 COLON CANCER SCREENING: ICD-10-CM

## 2021-12-06 DIAGNOSIS — K21.9 GASTROESOPHAGEAL REFLUX DISEASE WITHOUT ESOPHAGITIS: ICD-10-CM

## 2021-12-06 DIAGNOSIS — Z13.820 ENCOUNTER FOR OSTEOPOROSIS SCREENING IN ASYMPTOMATIC POSTMENOPAUSAL PATIENT: ICD-10-CM

## 2021-12-06 DIAGNOSIS — M15.9 PRIMARY OSTEOARTHRITIS INVOLVING MULTIPLE JOINTS: ICD-10-CM

## 2021-12-06 DIAGNOSIS — I10 ESSENTIAL HYPERTENSION: ICD-10-CM

## 2021-12-06 DIAGNOSIS — K21.9 GASTROESOPHAGEAL REFLUX DISEASE: ICD-10-CM

## 2021-12-06 DIAGNOSIS — M81.0 AGE-RELATED OSTEOPOROSIS WITHOUT CURRENT PATHOLOGICAL FRACTURE: ICD-10-CM

## 2021-12-06 DIAGNOSIS — Z00.00 WELL ADULT EXAM: Primary | ICD-10-CM

## 2021-12-06 DIAGNOSIS — Z12.31 ENCOUNTER FOR SCREENING MAMMOGRAM FOR MALIGNANT NEOPLASM OF BREAST: ICD-10-CM

## 2021-12-06 DIAGNOSIS — Z78.0 ENCOUNTER FOR OSTEOPOROSIS SCREENING IN ASYMPTOMATIC POSTMENOPAUSAL PATIENT: ICD-10-CM

## 2021-12-06 PROCEDURE — 99999 PR PBB SHADOW E&M-EST. PATIENT-LVL IV: CPT | Mod: PBBFAC,,, | Performed by: PEDIATRICS

## 2021-12-06 PROCEDURE — 99396 PREV VISIT EST AGE 40-64: CPT | Mod: S$PBB,,, | Performed by: PEDIATRICS

## 2021-12-06 PROCEDURE — 99396 PR PREVENTIVE VISIT,EST,40-64: ICD-10-PCS | Mod: S$PBB,,, | Performed by: PEDIATRICS

## 2021-12-06 PROCEDURE — 99214 OFFICE O/P EST MOD 30 MIN: CPT | Mod: PBBFAC | Performed by: PEDIATRICS

## 2021-12-06 PROCEDURE — 99999 PR PBB SHADOW E&M-EST. PATIENT-LVL IV: ICD-10-PCS | Mod: PBBFAC,,, | Performed by: PEDIATRICS

## 2021-12-06 RX ORDER — MELOXICAM 15 MG/1
15 TABLET ORAL DAILY
Qty: 90 TABLET | Refills: 3 | Status: SHIPPED | OUTPATIENT
Start: 2021-12-06 | End: 2021-12-09 | Stop reason: SDUPTHER

## 2021-12-06 RX ORDER — ALENDRONATE SODIUM 70 MG/1
TABLET ORAL
Qty: 12 TABLET | Refills: 3 | Status: SHIPPED | OUTPATIENT
Start: 2021-12-06 | End: 2022-10-10

## 2021-12-06 RX ORDER — VERAPAMIL HYDROCHLORIDE 240 MG/1
240 CAPSULE, EXTENDED RELEASE ORAL 2 TIMES DAILY
Qty: 180 CAPSULE | Refills: 3 | Status: SHIPPED | OUTPATIENT
Start: 2021-12-06 | End: 2021-12-09 | Stop reason: SDUPTHER

## 2021-12-06 RX ORDER — OMEPRAZOLE 20 MG/1
20 CAPSULE, DELAYED RELEASE ORAL DAILY
Qty: 90 CAPSULE | Refills: 3 | Status: SHIPPED | OUTPATIENT
Start: 2021-12-06 | End: 2021-12-09 | Stop reason: SDUPTHER

## 2021-12-06 RX ORDER — VARENICLINE TARTRATE 1 MG/1
1 TABLET, FILM COATED ORAL 2 TIMES DAILY
Qty: 60 TABLET | Refills: 10 | Status: SHIPPED | OUTPATIENT
Start: 2021-12-06 | End: 2021-12-09 | Stop reason: SDUPTHER

## 2022-02-02 DIAGNOSIS — I10 ESSENTIAL HYPERTENSION: ICD-10-CM

## 2022-04-14 DIAGNOSIS — Z12.11 ENCOUNTER FOR SCREENING COLONOSCOPY: Primary | ICD-10-CM

## 2022-04-19 ENCOUNTER — TELEPHONE (OUTPATIENT)
Dept: INTERNAL MEDICINE | Facility: CLINIC | Age: 62
End: 2022-04-19
Payer: COMMERCIAL

## 2022-04-19 DIAGNOSIS — Z00.00 WELL ADULT EXAM: ICD-10-CM

## 2022-04-19 DIAGNOSIS — Z12.31 ENCOUNTER FOR SCREENING MAMMOGRAM FOR MALIGNANT NEOPLASM OF BREAST: Primary | ICD-10-CM

## 2022-04-19 DIAGNOSIS — Z98.82 BREAST IMPLANT IN SITU: ICD-10-CM

## 2022-04-19 NOTE — TELEPHONE ENCOUNTER
S/w pt confirming [facility] order and routine fasting lab orders needed to be sent to [facility] for pt to complete testing at that location. Advised pt would update orders and fax to that location today. Pt manuela.

## 2022-04-19 NOTE — TELEPHONE ENCOUNTER
----- Message from Ember Martin sent at 4/18/2022  3:43 PM CDT -----  Contact: Gayle  Patient is calling to speak with the nurse regarding having external orders for both mammogram and routine annual labs sent to [facility]. Reports having had a breast augmentation and will require special mammogram orders. Requests a call back today if possible Please give patient a call back at [phone] to notify then patients will be allowed to schedule there.   Thanks,  RP

## 2022-05-09 ENCOUNTER — PATIENT MESSAGE (OUTPATIENT)
Dept: SMOKING CESSATION | Facility: CLINIC | Age: 62
End: 2022-05-09
Payer: COMMERCIAL

## 2022-05-30 ENCOUNTER — PATIENT OUTREACH (OUTPATIENT)
Dept: ADMINISTRATIVE | Facility: HOSPITAL | Age: 62
End: 2022-05-30
Payer: COMMERCIAL

## 2022-06-21 ENCOUNTER — PATIENT OUTREACH (OUTPATIENT)
Dept: ADMINISTRATIVE | Facility: HOSPITAL | Age: 62
End: 2022-06-21
Payer: COMMERCIAL

## 2022-06-21 NOTE — PROGRESS NOTES
Contacted patient about overdue Pap smear Patient's sister recently passed and she can't take off from work at this time. Patient will call back to schedule

## 2022-06-28 NOTE — TELEPHONE ENCOUNTER
Chief Complaint   Patient presents with    Dizziness     1. Have you been to the ER, urgent care clinic since your last visit? Hospitalized since your last visit? No    2. Have you seen or consulted any other health care providers outside of the [address] [place] [address] since your last visit? Include any pap smears or colon screening. No    verified patient with two type of identifiers. pt of Dr Serrano Climbeatriz with c/o dizziness x 1 day , ringing in ears. faint feeling,  Vision started getting dark,  Sat down and felt better. Submitted prior auth request to pt's ins for Celecoxib 200mg caps #90/90 rx online via [id number].

## 2022-06-29 DIAGNOSIS — F41.9 ANXIETY: ICD-10-CM

## 2022-06-29 RX ORDER — ALPRAZOLAM 0.5 MG/1
TABLET ORAL
Qty: 60 TABLET | Refills: 5 | Status: SHIPPED | OUTPATIENT
Start: 2022-06-29 | End: 2022-12-28

## 2022-06-29 NOTE — TELEPHONE ENCOUNTER
No new care gaps identified.  [facility] Embedded Care Gaps. Reference number: [id number]. 6/29/2022   10:17:33 AM ZAT

## 2022-07-18 ENCOUNTER — HOSPITAL ENCOUNTER (OUTPATIENT)
Dept: RADIOLOGY | Facility: HOSPITAL | Age: 62
Discharge: HOME | End: 2022-07-18
Attending: PEDIATRICS
Payer: COMMERCIAL

## 2022-07-18 DIAGNOSIS — Z13.820 ENCOUNTER FOR OSTEOPOROSIS SCREENING IN ASYMPTOMATIC POSTMENOPAUSAL PATIENT: ICD-10-CM

## 2022-07-18 DIAGNOSIS — Z98.82 BREAST IMPLANT IN SITU: ICD-10-CM

## 2022-07-18 DIAGNOSIS — Z78.0 ENCOUNTER FOR OSTEOPOROSIS SCREENING IN ASYMPTOMATIC POSTMENOPAUSAL PATIENT: ICD-10-CM

## 2022-07-18 DIAGNOSIS — Z12.31 ENCOUNTER FOR SCREENING MAMMOGRAM FOR MALIGNANT NEOPLASM OF BREAST: ICD-10-CM

## 2022-07-18 DIAGNOSIS — R92.8 ABNORMAL MAMMOGRAM: ICD-10-CM

## 2022-07-18 PROCEDURE — 77062 MAMMO DIGITAL DIAGNOSTIC BILAT WITH TOMO: ICD-10-PCS | Mod: 26,,, | Performed by: RADIOLOGY

## 2022-07-18 PROCEDURE — 77080 DXA BONE DENSITY AXIAL: CPT | Mod: TC

## 2022-07-18 PROCEDURE — 76642 ULTRASOUND BREAST LIMITED: CPT | Mod: TC,LT

## 2022-07-18 PROCEDURE — 77080 DXA BONE DENSITY AXIAL: CPT | Mod: 26,,, | Performed by: STUDENT IN AN ORGANIZED HEALTH CARE EDUCATION/TRAINING PROGRAM

## 2022-07-18 PROCEDURE — 77066 DX MAMMO INCL CAD BI: CPT | Mod: 26,,, | Performed by: RADIOLOGY

## 2022-07-18 PROCEDURE — 77080 DEXA BONE DENSITY SPINE HIP: ICD-10-PCS | Mod: 26,,, | Performed by: STUDENT IN AN ORGANIZED HEALTH CARE EDUCATION/TRAINING PROGRAM

## 2022-07-18 PROCEDURE — 76642 US BREAST LEFT LIMITED: ICD-10-PCS | Mod: 26,LT,, | Performed by: RADIOLOGY

## 2022-07-18 PROCEDURE — 76642 ULTRASOUND BREAST LIMITED: CPT | Mod: 26,LT,, | Performed by: RADIOLOGY

## 2022-07-18 PROCEDURE — 77062 BREAST TOMOSYNTHESIS BI: CPT | Mod: 26,,, | Performed by: RADIOLOGY

## 2022-07-18 PROCEDURE — 77066 DX MAMMO INCL CAD BI: CPT | Mod: TC

## 2022-07-18 PROCEDURE — 77066 MAMMO DIGITAL DIAGNOSTIC BILAT WITH TOMO: ICD-10-PCS | Mod: 26,,, | Performed by: RADIOLOGY

## 2022-07-19 ENCOUNTER — TELEPHONE (OUTPATIENT)
Dept: INTERNAL MEDICINE | Facility: CLINIC | Age: 62
End: 2022-07-19
Payer: COMMERCIAL

## 2022-07-19 NOTE — TELEPHONE ENCOUNTER
Results are not read yet from yesterday, the mammogram was. As soon I get back I will let her know, likely tonight.

## 2022-07-19 NOTE — TELEPHONE ENCOUNTER
Spoke with patient. Would like to know if provider has reviewed bone density test and if so ask that results are released to review in the patient portal. Please advise.//ddw

## 2022-07-25 DIAGNOSIS — M81.0 AGE-RELATED OSTEOPOROSIS WITHOUT CURRENT PATHOLOGICAL FRACTURE: Primary | ICD-10-CM

## 2022-09-27 LAB
ESTIMATED AVG GLUCOSE: 108 MG/DL
HBA1C MFR BLD: 5.2 % (ref 4.2–6.3)

## 2022-10-09 NOTE — PROGRESS NOTES
"     RHEUMATOLOGY OUTPATIENT CLINIC NOTE    10/9/2022    Attending Rheumatologist: Reina Moore PA-C  Primary Care Provider: DARLENE Carolina Jr, MD   Chief Complaint/Reason For Consultation:  Osteoporosis      Subjective:        Gayle Rhodes is a 62 y.o. female here today to [facility] care for osteoporosis failing fosamax. Fractured right wrist in 2018. No fx since that time. Recent hospitalization and is working on smoking cessation at this time. No upcoming dental procedures. She travels extensively for her work- can be gone for months at a time. Rheumatologic systems otherwise negative. Physical exam shows no synovitis.    Last Eye Exam: n/a    Serologies    Lab Results   Component Value Date    HEPCAB Negative 11/26/2019        Current Rheum Medications:  N/a  Previous Rheum Medications:   Fosamax- worsening bone density  History of Spinal Disorders  C-Spine:  L-Spine:  Pain Management History:        Past Medical History:   Diagnosis Date    Anxiety     Unspecified essential hypertension      Social History     Socioeconomic History    Marital status:    Tobacco Use    Smoking status: Every Day     Packs/day: 0.25     Types: Cigarettes    Smokeless tobacco: Never   Substance and Sexual Activity    Alcohol use: Yes     Comment: Social    Drug use: No    Sexual activity: Yes     Partners: Male   Social History Narrative     cigar smoker, no pets.     Review of patient's allergies indicates:   Allergen Reactions    Amlodipine Swelling     LE swelling    Penicillins Swelling       Objective:   /80   Pulse 66   Ht 5' 6" (1.676 m)   Wt 42.9 kg (94 lb 9.2 oz)   BMI 15.27 kg/m²     Immunization History   Administered Date(s) Administered    COVID-19, MRNA, LN-S, PF (MODERNA FULL 0.5 ML DOSE) 02/05/2021, 03/03/2021, 12/08/2021    Influenza 10/06/2015    Influenza - Quadrivalent - PF *Preferred* (6 months and older) 11/26/2019, 11/02/2020    Influenza - Trivalent (ADULT) 10/02/2017    " Pneumococcal Conjugate - 13 Valent 11/02/2020    Pneumococcal Polysaccharide - 23 Valent 11/26/2019    Tdap 11/26/2019       Current Outpatient Medications:     ALPRAZolam (XANAX) 0.5 MG tablet, TAKE 1 TABLET BY MOUTH TWICE DAILY AS NEEDED, Disp: 60 tablet, Rfl: 5    aspirin (ECOTRIN) 81 MG EC tablet, Take 1 tablet (81 mg total) by mouth once daily., Disp: 90 tablet, Rfl: 1    atorvastatin (LIPITOR) 40 MG tablet, Take 1 tablet (40 mg total) by mouth once daily., Disp: 90 tablet, Rfl: 1    CALCIUM CARB/VIT D3/MINERALS (CALCIUM-VITAMIN D ORAL), , Disp: , Rfl:     cyclobenzaprine (FLEXERIL) 5 MG tablet, Take 5 mg by mouth nightly as needed., Disp: , Rfl:     EScitalopram oxalate (LEXAPRO) 10 MG tablet, Take 1 tablet (10 mg total) by mouth once daily., Disp: 90 tablet, Rfl: 1    folic acid/multivit-min/lutein (CENTRUM SILVER ORAL), Take 1 tablet by mouth once daily., Disp: , Rfl:     ibuprofen (ADVIL,MOTRIN) 600 MG tablet, ibuprofen Take 1 tablet (oral) 3 times per day for 10 days 20210628 tablet 3 times per day oral 10 days active 600 MG, Disp: , Rfl:     ofloxacin (FLOXIN) 0.3 % otic solution, ofloxacin Apply 10 drops (otic (ear)) 1 time per day for 7 days [id number] drops 1 time per day otic (ear) 7 days active 0.3 %, Disp: , Rfl:     omeprazole (PRILOSEC) 20 MG capsule, Take 1 capsule (20 mg total) by mouth once daily., Disp: 90 capsule, Rfl: 3    traMADol (ULTRAM) 50 mg tablet, Take 1 tablet by mouth every 8 (eight) hours as needed., Disp: , Rfl: 0    verapamiL (VERELAN) 240 MG C24P, Take 1 capsule (240 mg total) by mouth 2 (two) times a day., Disp: 180 capsule, Rfl: 3     No results found for this or any previous visit (from the past 336 hour(s)).      Imaging:  All imaging reviewed and independently interpreted by me.    DEXA BONE DENSITY SPINE HIP 7/18/22  COMPARISON:  03/07/2016     FINDINGS:  LUMBAR SPINE  The L1-L2 vertebral bone mineral density is equal to 0.895 g/cm squared with a T score of -2.3.  There has  been a decrease in bone mineral density of the lumbar spine relative to the prior study.     LEFT HIP  The left femoral neck bone mineral density is equal to 0.618 g/cm squared with a T score of -3.     The left total hip bone mineral density is equal to 0.576 g/cm squared with a T score of -3.4.  There has been a decrease in the bone mineral density of the left hip relative to the prior study.     RIGHT HIP  The right femoral neck bone mineral density is equal to 0.570 g/cm squared with a T score of -3.4.     The right total hip bone mineral density is equal to 0.567 g/cm squared with a T score of -3.5.  There has been a decrease in the bone mineral density of the right hip relative to the prior study.     FRAX 10-YEAR PROBABILITY OF FRACTURE     There is a 65.7% risk of a major osteoporotic fracture and a 38.8% risk of hip fracture in the next 10 years (FRAX).    Assessment:     1. Age-related osteoporosis without current pathological fracture    2. History of pathological fracture due to osteoporosis          Plan:       Check vitamin D, CMP, PTH levels today. Will plan to start forteo at this time for severe osteoporosis w/ hx fragility fracture in 2018  Patient provided w/ educational materials on osteoporosis and forteo  Patient understands and contact clinic at any time regarding questions about today's office visit and any medication changes that were made  Return to clinic: 6 mos  Next Labs: tomorrow at [facility]    The patient understands, chooses and consents to this plan and accepts all the risks which include but are not limited to the risks mentioned above.     Method of contact with patient concerns: [facility]    60 minutes of total time spent on the encounter, which includes face to face time and non-face to face time preparing to see the patient (eg, review of tests), Obtaining and/or reviewing separately obtained history, Documenting clinical information in the electronic or  other health record, Independently interpreting results (not separately reported) and communicating results to the patient/family/caregiver, or Care coordination (not separately reported).          Disclaimer: This note was prepared using a voice recognition system and is likely to have sound alike errors within the text.       Reina Moore PA-C  Rheumatology Department   [facility]

## 2022-10-10 ENCOUNTER — OFFICE VISIT (OUTPATIENT)
Dept: INTERNAL MEDICINE | Facility: CLINIC | Age: 62
End: 2022-10-10
Payer: COMMERCIAL

## 2022-10-10 ENCOUNTER — OFFICE VISIT (OUTPATIENT)
Dept: RHEUMATOLOGY | Facility: CLINIC | Age: 62
End: 2022-10-10
Payer: COMMERCIAL

## 2022-10-10 VITALS
WEIGHT: 94.56 LBS | BODY MASS INDEX: 15.2 KG/M2 | DIASTOLIC BLOOD PRESSURE: 80 MMHG | HEIGHT: 66 IN | HEART RATE: 66 BPM | SYSTOLIC BLOOD PRESSURE: 122 MMHG

## 2022-10-10 VITALS
HEIGHT: 66 IN | SYSTOLIC BLOOD PRESSURE: 126 MMHG | DIASTOLIC BLOOD PRESSURE: 82 MMHG | BODY MASS INDEX: 14.96 KG/M2 | WEIGHT: 93.06 LBS | OXYGEN SATURATION: 96 % | TEMPERATURE: 98 F | HEART RATE: 83 BPM

## 2022-10-10 DIAGNOSIS — Z09 HOSPITAL DISCHARGE FOLLOW-UP: Primary | ICD-10-CM

## 2022-10-10 DIAGNOSIS — Z87.310 HISTORY OF PATHOLOGICAL FRACTURE DUE TO OSTEOPOROSIS: ICD-10-CM

## 2022-10-10 DIAGNOSIS — I70.90 ATHEROSCLEROSIS: ICD-10-CM

## 2022-10-10 DIAGNOSIS — K21.9 GASTROESOPHAGEAL REFLUX DISEASE WITHOUT ESOPHAGITIS: ICD-10-CM

## 2022-10-10 DIAGNOSIS — F17.200 TOBACCO DEPENDENCE: ICD-10-CM

## 2022-10-10 DIAGNOSIS — Z12.11 ENCOUNTER FOR SCREENING FOR MALIGNANT NEOPLASM OF COLON: ICD-10-CM

## 2022-10-10 DIAGNOSIS — M81.0 AGE-RELATED OSTEOPOROSIS WITHOUT CURRENT PATHOLOGICAL FRACTURE: Primary | ICD-10-CM

## 2022-10-10 DIAGNOSIS — F41.9 ANXIETY: ICD-10-CM

## 2022-10-10 DIAGNOSIS — I10 ESSENTIAL HYPERTENSION: ICD-10-CM

## 2022-10-10 PROCEDURE — 3079F PR MOST RECENT DIASTOLIC BLOOD PRESSURE 80-89 MM HG: ICD-10-PCS | Mod: CPTII,S$GLB,, | Performed by: PHYSICIAN ASSISTANT

## 2022-10-10 PROCEDURE — 3074F PR MOST RECENT SYSTOLIC BLOOD PRESSURE < 130 MM HG: ICD-10-PCS | Mod: CPTII,S$GLB,, | Performed by: NURSE PRACTITIONER

## 2022-10-10 PROCEDURE — 99214 OFFICE O/P EST MOD 30 MIN: CPT | Mod: S$GLB,,, | Performed by: NURSE PRACTITIONER

## 2022-10-10 PROCEDURE — 3008F PR BODY MASS INDEX (BMI) DOCUMENTED: ICD-10-PCS | Mod: CPTII,S$GLB,, | Performed by: PHYSICIAN ASSISTANT

## 2022-10-10 PROCEDURE — 3074F SYST BP LT 130 MM HG: CPT | Mod: CPTII,S$GLB,, | Performed by: NURSE PRACTITIONER

## 2022-10-10 PROCEDURE — 3008F BODY MASS INDEX DOCD: CPT | Mod: CPTII,S$GLB,, | Performed by: PHYSICIAN ASSISTANT

## 2022-10-10 PROCEDURE — 99999 PR PBB SHADOW E&M-EST. PATIENT-LVL IV: CPT | Mod: PBBFAC,,, | Performed by: PHYSICIAN ASSISTANT

## 2022-10-10 PROCEDURE — 99999 PR PBB SHADOW E&M-EST. PATIENT-LVL IV: CPT | Mod: PBBFAC,,, | Performed by: NURSE PRACTITIONER

## 2022-10-10 PROCEDURE — 99205 PR OFFICE/OUTPT VISIT, NEW, LEVL V, 60-74 MIN: ICD-10-PCS | Mod: S$GLB,,, | Performed by: PHYSICIAN ASSISTANT

## 2022-10-10 PROCEDURE — 99205 OFFICE O/P NEW HI 60 MIN: CPT | Mod: S$GLB,,, | Performed by: PHYSICIAN ASSISTANT

## 2022-10-10 PROCEDURE — 99999 PR PBB SHADOW E&M-EST. PATIENT-LVL IV: ICD-10-PCS | Mod: PBBFAC,,, | Performed by: NURSE PRACTITIONER

## 2022-10-10 PROCEDURE — 3008F PR BODY MASS INDEX (BMI) DOCUMENTED: ICD-10-PCS | Mod: CPTII,S$GLB,, | Performed by: NURSE PRACTITIONER

## 2022-10-10 PROCEDURE — 3008F BODY MASS INDEX DOCD: CPT | Mod: CPTII,S$GLB,, | Performed by: NURSE PRACTITIONER

## 2022-10-10 PROCEDURE — 99999 PR PBB SHADOW E&M-EST. PATIENT-LVL IV: ICD-10-PCS | Mod: PBBFAC,,, | Performed by: PHYSICIAN ASSISTANT

## 2022-10-10 PROCEDURE — 99214 PR OFFICE/OUTPT VISIT, EST, LEVL IV, 30-39 MIN: ICD-10-PCS | Mod: S$GLB,,, | Performed by: NURSE PRACTITIONER

## 2022-10-10 PROCEDURE — 3079F PR MOST RECENT DIASTOLIC BLOOD PRESSURE 80-89 MM HG: ICD-10-PCS | Mod: CPTII,S$GLB,, | Performed by: NURSE PRACTITIONER

## 2022-10-10 PROCEDURE — 99214 OFFICE O/P EST MOD 30 MIN: CPT | Mod: PBBFAC | Performed by: PHYSICIAN ASSISTANT

## 2022-10-10 PROCEDURE — 1159F MED LIST DOCD IN RCRD: CPT | Mod: CPTII,S$GLB,, | Performed by: PHYSICIAN ASSISTANT

## 2022-10-10 PROCEDURE — 3079F DIAST BP 80-89 MM HG: CPT | Mod: CPTII,S$GLB,, | Performed by: NURSE PRACTITIONER

## 2022-10-10 PROCEDURE — 3079F DIAST BP 80-89 MM HG: CPT | Mod: CPTII,S$GLB,, | Performed by: PHYSICIAN ASSISTANT

## 2022-10-10 PROCEDURE — 1159F PR MEDICATION LIST DOCUMENTED IN MEDICAL RECORD: ICD-10-PCS | Mod: CPTII,S$GLB,, | Performed by: PHYSICIAN ASSISTANT

## 2022-10-10 PROCEDURE — 99214 OFFICE O/P EST MOD 30 MIN: CPT | Mod: PBBFAC,27 | Performed by: NURSE PRACTITIONER

## 2022-10-10 PROCEDURE — 3074F SYST BP LT 130 MM HG: CPT | Mod: CPTII,S$GLB,, | Performed by: PHYSICIAN ASSISTANT

## 2022-10-10 PROCEDURE — 3074F PR MOST RECENT SYSTOLIC BLOOD PRESSURE < 130 MM HG: ICD-10-PCS | Mod: CPTII,S$GLB,, | Performed by: PHYSICIAN ASSISTANT

## 2022-10-10 RX ORDER — ASPIRIN 81 MG/1
81 TABLET ORAL DAILY
Qty: 90 TABLET | Refills: 1 | Status: SHIPPED | OUTPATIENT
Start: 2022-10-10 | End: 2023-07-18 | Stop reason: SDUPTHER

## 2022-10-10 RX ORDER — ATORVASTATIN CALCIUM 40 MG/1
40 TABLET, FILM COATED ORAL DAILY
Qty: 90 TABLET | Refills: 1 | Status: SHIPPED | OUTPATIENT
Start: 2022-10-10 | End: 2023-04-20

## 2022-10-10 RX ORDER — ATORVASTATIN CALCIUM 40 MG/1
40 TABLET, FILM COATED ORAL DAILY
COMMUNITY
Start: 2022-09-29 | End: 2022-10-10 | Stop reason: SDUPTHER

## 2022-10-10 RX ORDER — CYCLOBENZAPRINE HCL 5 MG
5 TABLET ORAL NIGHTLY PRN
COMMUNITY
Start: 2022-07-20

## 2022-10-10 RX ORDER — ASPIRIN 81 MG/1
81 TABLET ORAL DAILY
COMMUNITY
Start: 2022-09-29 | End: 2022-10-10 | Stop reason: SDUPTHER

## 2022-10-10 RX ORDER — ESCITALOPRAM OXALATE 10 MG/1
10 TABLET ORAL DAILY
Qty: 90 TABLET | Refills: 1 | Status: SHIPPED | OUTPATIENT
Start: 2022-10-10 | End: 2023-04-17

## 2022-10-10 RX ORDER — TERIPARATIDE 250 UG/ML
20 INJECTION, SOLUTION SUBCUTANEOUS DAILY
Qty: 2.4 ML | Refills: 11 | Status: SHIPPED | OUTPATIENT
Start: 2022-10-10 | End: 2023-10-10

## 2022-10-10 RX ORDER — OFLOXACIN 3 MG/ML
SOLUTION AURICULAR (OTIC)
COMMUNITY
Start: 2021-06-28

## 2022-10-10 RX ORDER — IBUPROFEN 600 MG/1
TABLET ORAL
COMMUNITY
Start: 2021-06-28 | End: 2024-01-03

## 2022-10-10 RX ORDER — ESCITALOPRAM OXALATE 10 MG/1
10 TABLET ORAL DAILY
COMMUNITY
Start: 2022-09-29 | End: 2022-10-10 | Stop reason: SDUPTHER

## 2022-10-10 NOTE — PROGRESS NOTES
Chief Complaint  Chief Complaint   Patient presents with    Follow-up         HPI     HPI  Gayle Rhodes is a 62 y.o. female with medical diagnoses as listed in the medical history and problem list that presents for Hospital Discharge Follow-up. This patient is new to me.       Hospital Discharge Follow-up: Reports hospitalization at [facility] after experiencing dizziness and weakness at work on 09/27/2022. At the time of hospitalization she reports an elevate blood pressure. Reports compliance with taking Verapamil BID. Vitals are with acceptable limits today. She was taken by ambulance to the hospital. She reports imaging studies confirmed atherosclerosis. She was started on Lipitor 40 mg, ASA 81 mg and Celexa. She currently uses Nicotine however, she reports cutting back.   Pertinent negatives are chest pain/palpitations/tightness/discomfort, SOB, GI upset, urinary/bowel changes, unexplained weight loss/gain, dizziness/headaches/syncope.     History     PAST MEDICAL HISTORY:  Past Medical History:   Diagnosis Date    Anxiety     Unspecified essential hypertension        PAST SURGICAL HISTORY:  Past Surgical History:   Procedure Laterality Date    ADENOIDECTOMY      APPENDECTOMY  5/15/15    BREAST SURGERY      MANDIBLE SURGERY      TONSILLECTOMY         SOCIAL HISTORY:  Social History     Socioeconomic History    Marital status:    Tobacco Use    Smoking status: Every Day     Packs/day: 0.25     Types: Cigarettes    Smokeless tobacco: Never   Substance and Sexual Activity    Alcohol use: Yes     Comment: Social    Drug use: No    Sexual activity: Yes     Partners: Male   Social History Narrative     cigar smoker, no pets.       FAMILY HISTORY:  Family History   Problem Relation Age of Onset    HIV Mother     Cancer Father     Hypertension Sister     Hypertension Brother     Diabetes Maternal Grandmother        ALLERGIES AND MEDICATIONS: updated and reviewed.  Review of patient's  allergies indicates:   Allergen Reactions    Amlodipine Swelling     LE swelling    Penicillins Swelling     Current Outpatient Medications   Medication Sig Dispense Refill    ALPRAZolam (XANAX) 0.5 MG tablet TAKE 1 TABLET BY MOUTH TWICE DAILY AS NEEDED 60 tablet 5    CALCIUM CARB/VIT D3/MINERALS (CALCIUM-VITAMIN D ORAL)       cyclobenzaprine (FLEXERIL) 5 MG tablet Take 5 mg by mouth nightly as needed.      folic acid/multivit-min/lutein (CENTRUM SILVER ORAL) Take 1 tablet by mouth once daily.      ibuprofen (ADVIL,MOTRIN) 600 MG tablet ibuprofen Take 1 tablet (oral) 3 times per day for 10 days [id number] tablet 3 times per day oral 10 days active 600 MG      ofloxacin (FLOXIN) 0.3 % otic solution ofloxacin Apply 10 drops (otic (ear)) 1 time per day for 7 days [id number] drops 1 time per day otic (ear) 7 days active 0.3 %      omeprazole (PRILOSEC) 20 MG capsule Take 1 capsule (20 mg total) by mouth once daily. 90 capsule 3    traMADol (ULTRAM) 50 mg tablet Take 1 tablet by mouth every 8 (eight) hours as needed.  0    verapamiL (VERELAN) 240 MG C24P Take 1 capsule (240 mg total) by mouth 2 (two) times a day. 180 capsule 3    aspirin (ECOTRIN) 81 MG EC tablet Take 1 tablet (81 mg total) by mouth once daily. 90 tablet 1    atorvastatin (LIPITOR) 40 MG tablet Take 1 tablet (40 mg total) by mouth once daily. 90 tablet 1    EScitalopram oxalate (LEXAPRO) 10 MG tablet Take 1 tablet (10 mg total) by mouth once daily. 90 tablet 1    teriparatide (FORTEO) 20 mcg/dose (600mcg/2.4mL) PnTasia Inject 0.08 mLs (20 mcg total) into the skin Daily. 2.4 mL 11     No current facility-administered medications for this visit.           Exam     ROS  Review of Systems   Constitutional:  Negative for appetite change, chills, fatigue and fever.   HENT:  Negative for congestion, ear pain, postnasal drip, rhinorrhea, sinus pressure, sneezing and sore throat.    Respiratory:  Negative for shortness of breath.    Cardiovascular:  Negative for chest  "pain and palpitations.   Gastrointestinal:  Negative for abdominal pain, constipation, diarrhea, nausea and vomiting.   Genitourinary:  Negative for dysuria.   Musculoskeletal:  Negative for arthralgias.   Neurological:  Negative for headaches.   Psychiatric/Behavioral:  Negative for sleep disturbance.          Physical Exam  Vitals:    10/10/22 0918   BP: 126/82   BP Location: Left arm   Patient Position: Sitting   BP Method: Medium (Manual)   Pulse: 83   Temp: 98.2 °F (36.8 °C)   TempSrc: Oral   SpO2: 96%   Weight: 42.2 kg (93 lb 0.6 oz)   Height: 5' 6" (1.676 m)    Body mass index is 15.02 kg/m².  Weight: 42.2 kg (93 lb 0.6 oz)   Height: 5' 6" (167.6 cm)   Physical Exam  Constitutional:       General: She is not in acute distress.  HENT:      Head: Normocephalic and atraumatic.      Right Ear: External ear normal.      Left Ear: External ear normal.      Nose: Nose normal.   Eyes:      Pupils: Pupils are equal, round, and reactive to light.   Cardiovascular:      Rate and Rhythm: Normal rate and regular rhythm.      Pulses: Normal pulses.   Pulmonary:      Effort: Pulmonary effort is normal. No respiratory distress.      Breath sounds: Normal breath sounds. No wheezing.   Abdominal:      General: Bowel sounds are normal.      Palpations: Abdomen is soft.   Musculoskeletal:      Cervical back: Neck supple.   Lymphadenopathy:      Cervical: No cervical adenopathy.   Skin:     General: Skin is warm and dry.   Neurological:      Mental Status: She is alert and oriented to person, place, and time.   Psychiatric:         Mood and Affect: Mood normal.           Health Maintenance         Date Due Completion Date    Cervical Cancer Screening Never done ---    HIV Screening Never done ---    Shingles Vaccine (1 of 2) Never done ---    Colorectal Cancer Screening 02/11/2015 2/11/2010    COVID-19 Vaccine (4 - Booster for Moderna series) 02/02/2022 12/8/2021    Influenza Vaccine (1) 09/01/2022 11/2/2020    Mammogram " 07/18/2023 7/18/2022    DEXA Scan 07/18/2024 7/18/2022    Pneumococcal Vaccines (Age 0-64) (3 - PPSV23 if available, else PCV20) 03/17/2025 11/2/2020    Lipid Panel 11/02/2025 11/2/2020    TETANUS VACCINE 11/26/2029 11/26/2019              Assessment & Plan     Assessment & Plan  Problem List Items Addressed This Visit          Psychiatric    Anxiety  -The current medical regimen is effective;  continue present plan and medications.     Relevant Medications    EScitalopram oxalate (LEXAPRO) 10 MG tablet, Daily        Cardiac/Vascular    Essential hypertension  -The current medical regimen is effective;  continue present plan and medications.     Relevant Orders    CBC Auto Differential    Comprehensive Metabolic Panel       GI    Gastroesophageal reflux disease without esophagitis  -The current medical regimen is effective;  continue present plan and medications.        Other    Tobacco dependence  -We dicussed smoking cessation for approx 3-10 minutes    Relevant Orders    Ambulatory referral/consult to Smoking Cessation Program     Other Visit Diagnoses       Hospital discharge follow-up    -  Primary  -Counseled on age appropriate medical preventative services including age appropriate cancer screenings, age appropriate eye and dental exams and over all nutritional health.  Counseled on age appropriate vaccines and discussed upcoming health care needs based on age/gender. Discussed good sleep hygiene and stress management.    Atherosclerosis        Relevant Medications    atorvastatin (LIPITOR) 40 MG tablet, Daily     aspirin (ECOTRIN) 81 MG EC tablet, Daily     Other Relevant Orders    Lipid Panel    Encounter for screening for malignant neoplasm of colon      -As ordered               Health Maintenance reviewed: Deferred per patient    Follow-up: 2 months for Chronic Conditions

## 2022-10-10 NOTE — LETTER
October 10, 2022      The [address]  [address] [facility]  [place] [address]  Phone: [phone]  Fax: [phone]       Patient: Gayle Rhodes   YOB: 1960  Date of Visit: 10/10/2022    To Whom It May Concern:    Jonathan Rhodes  was at [facility] on 10/10/2022. The patient may return to work/school on 10/24/2022 with no restrictions. If you have any questions or concerns, or if I can be of further assistance, please do not hesitate to contact me.    Sincerely,    Kristofer Santa, NP

## 2022-10-11 ENCOUNTER — LAB VISIT (OUTPATIENT)
Dept: LAB | Facility: HOSPITAL | Age: 62
End: 2022-10-11
Attending: PHYSICIAN ASSISTANT
Payer: COMMERCIAL

## 2022-10-11 ENCOUNTER — SPECIALTY PHARMACY (OUTPATIENT)
Dept: PHARMACY | Facility: CLINIC | Age: 62
End: 2022-10-11
Payer: COMMERCIAL

## 2022-10-11 DIAGNOSIS — M81.0 AGE-RELATED OSTEOPOROSIS WITHOUT CURRENT PATHOLOGICAL FRACTURE: Primary | ICD-10-CM

## 2022-10-11 DIAGNOSIS — I70.90 ATHEROSCLEROSIS: ICD-10-CM

## 2022-10-11 DIAGNOSIS — M81.0 SENILE OSTEOPOROSIS: Primary | ICD-10-CM

## 2022-10-11 DIAGNOSIS — I10 ESSENTIAL HYPERTENSION: ICD-10-CM

## 2022-10-11 LAB
ALBUMIN SERPL-MCNC: 4.3 GM/DL (ref 3.4–4.8)
ALBUMIN/GLOB SERPL: 1.9 RATIO (ref 1.1–2)
ALP SERPL-CCNC: 72 UNIT/L (ref 40–150)
ALT SERPL-CCNC: 19 UNIT/L (ref 0–55)
AST SERPL-CCNC: 25 UNIT/L (ref 5–34)
BASOPHILS # BLD AUTO: 0.06 X10(3)/MCL (ref 0–0.2)
BASOPHILS NFR BLD AUTO: 1 %
BILIRUBIN DIRECT+TOT PNL SERPL-MCNC: 0.7 MG/DL
BUN SERPL-MCNC: 7.4 MG/DL (ref 9.8–20.1)
CALCIUM SERPL-MCNC: 9.6 MG/DL (ref 8.4–10.2)
CHLORIDE SERPL-SCNC: 104 MMOL/L (ref 98–107)
CHOLEST SERPL-MCNC: 166 MG/DL
CHOLEST/HDLC SERPL: 2 {RATIO} (ref 0–5)
CO2 SERPL-SCNC: 29 MMOL/L (ref 23–31)
CREAT SERPL-MCNC: 0.67 MG/DL (ref 0.55–1.02)
DEPRECATED CALCIDIOL+CALCIFEROL SERPL-MC: 26.5 NG/ML (ref 30–80)
EOSINOPHIL # BLD AUTO: 0.13 X10(3)/MCL (ref 0–0.9)
EOSINOPHIL NFR BLD AUTO: 2.3 %
ERYTHROCYTE [DISTWIDTH] IN BLOOD BY AUTOMATED COUNT: 12.1 % (ref 11.5–17)
GFR SERPLBLD CREATININE-BSD FMLA CKD-EPI: >60 MLS/MIN/1.73/M2
GLOBULIN SER-MCNC: 2.3 GM/DL (ref 2.4–3.5)
GLUCOSE SERPL-MCNC: 77 MG/DL (ref 82–115)
HCT VFR BLD AUTO: 46.6 % (ref 37–47)
HDLC SERPL-MCNC: 78 MG/DL (ref 35–60)
HGB BLD-MCNC: 15.6 GM/DL (ref 12–16)
IMM GRANULOCYTES # BLD AUTO: 0.01 X10(3)/MCL (ref 0–0.04)
IMM GRANULOCYTES NFR BLD AUTO: 0.2 %
LDLC SERPL CALC-MCNC: 76 MG/DL (ref 50–140)
LYMPHOCYTES # BLD AUTO: 2.18 X10(3)/MCL (ref 0.6–4.6)
LYMPHOCYTES NFR BLD AUTO: 37.8 %
MCH RBC QN AUTO: 32.9 PG (ref 27–31)
MCHC RBC AUTO-ENTMCNC: 33.5 MG/DL (ref 33–36)
MCV RBC AUTO: 98.3 FL (ref 80–94)
MONOCYTES # BLD AUTO: 0.45 X10(3)/MCL (ref 0.1–1.3)
MONOCYTES NFR BLD AUTO: 7.8 %
NEUTROPHILS # BLD AUTO: 2.9 X10(3)/MCL (ref 2.1–9.2)
NEUTROPHILS NFR BLD AUTO: 50.9 %
NRBC BLD AUTO-RTO: 0 %
PLATELET # BLD AUTO: 204 X10(3)/MCL (ref 130–400)
PMV BLD AUTO: 11.3 FL (ref 7.4–10.4)
POTASSIUM SERPL-SCNC: 4.3 MMOL/L (ref 3.5–5.1)
PROT SERPL-MCNC: 6.6 GM/DL (ref 5.8–7.6)
PTH-INTACT SERPL-MCNC: 70.5 PG/ML (ref 8.7–77)
RBC # BLD AUTO: 4.74 X10(6)/MCL (ref 4.2–5.4)
SODIUM SERPL-SCNC: 140 MMOL/L (ref 136–145)
TRIGL SERPL-MCNC: 62 MG/DL (ref 37–140)
VLDLC SERPL CALC-MCNC: 12 MG/DL
WBC # SPEC AUTO: 5.8 X10(3)/MCL (ref 4.5–11.5)

## 2022-10-11 PROCEDURE — 80061 LIPID PANEL: CPT

## 2022-10-11 PROCEDURE — 82306 VITAMIN D 25 HYDROXY: CPT

## 2022-10-11 PROCEDURE — 80053 COMPREHEN METABOLIC PANEL: CPT

## 2022-10-11 PROCEDURE — 85025 COMPLETE CBC W/AUTO DIFF WBC: CPT

## 2022-10-11 PROCEDURE — 36415 COLL VENOUS BLD VENIPUNCTURE: CPT

## 2022-10-11 PROCEDURE — 83970 ASSAY OF PARATHORMONE: CPT

## 2022-10-11 NOTE — TELEPHONE ENCOUNTER
Srinath, this is [place] with [facility].  We are working on your prescription that your doctor has sent us. We will be working with your insurance to get this approved for you. We will be calling you along the way with updates on your medication.  If you have any questions, you can reach us at [phone].    Welcome call outcome: Patient/caregiver reached

## 2022-10-12 ENCOUNTER — TELEPHONE (OUTPATIENT)
Dept: RHEUMATOLOGY | Facility: CLINIC | Age: 62
End: 2022-10-12
Payer: COMMERCIAL

## 2022-10-12 DIAGNOSIS — E55.9 VITAMIN D DEFICIENCY: Primary | ICD-10-CM

## 2022-10-12 RX ORDER — ERGOCALCIFEROL 1.25 MG/1
50000 CAPSULE ORAL
Qty: 12 CAPSULE | Refills: 3 | Status: SHIPPED | OUTPATIENT
Start: 2022-10-12 | End: 2023-07-18 | Stop reason: SDUPTHER

## 2022-10-12 NOTE — TELEPHONE ENCOUNTER
PA approved -   9/11/22 -10/11/2023    Only one fill allowed per Test Claim - $1179.47    Sending to BI/FA

## 2022-10-13 NOTE — TELEPHONE ENCOUNTER
Outgoing call to pt regarding Forteo approval and Network. Provided pt with [facility] phone number.    Rx routed to [facility] ([place], [place])  Closing out referral at [facility]

## 2022-10-13 NOTE — TELEPHONE ENCOUNTER
1st attempt to contact patient in regards to provider sending in Vitamin D prescription. Patient did not answer. LVM for patient to return call to clinic. Left patient portal message as well-DD

## 2022-10-13 NOTE — TELEPHONE ENCOUNTER
Benefit Investigation    Forteo (specialty drug)  [facility] [employer] FEP per Sabina  Deductible: none  Max OOP: $6000   Estimated copay: $1179.47 ([facility]) /   $65 ([facility] 30 days); $185 ([facility] 90 days)  [facility] is OON: [facility]: [phone]      Pt has Medicaid as secondary - FA not required

## 2022-10-17 DIAGNOSIS — M81.0 AGE-RELATED OSTEOPOROSIS WITHOUT CURRENT PATHOLOGICAL FRACTURE: ICD-10-CM

## 2022-10-17 DIAGNOSIS — Z87.310 HISTORY OF PATHOLOGICAL FRACTURE DUE TO OSTEOPOROSIS: ICD-10-CM

## 2022-10-17 RX ORDER — TERIPARATIDE 250 UG/ML
20 INJECTION, SOLUTION SUBCUTANEOUS DAILY
Qty: 2.4 EACH | Refills: 11 | OUTPATIENT
Start: 2022-10-17 | End: 2023-10-17

## 2022-10-20 ENCOUNTER — TELEPHONE (OUTPATIENT)
Dept: RHEUMATOLOGY | Facility: CLINIC | Age: 62
End: 2022-10-20
Payer: COMMERCIAL

## 2022-10-20 ENCOUNTER — TELEPHONE (OUTPATIENT)
Dept: INTERNAL MEDICINE | Facility: CLINIC | Age: 62
End: 2022-10-20
Payer: COMMERCIAL

## 2022-10-20 NOTE — TELEPHONE ENCOUNTER
Gave [facility] specialty pharmacy a call to give clarification on the forteo. Expressed to pharmacy that the generic for forteo can be filled. -DD

## 2022-10-20 NOTE — TELEPHONE ENCOUNTER
"----- Message from Samantha Nelson sent at 10/20/2022 10:16 AM CDT -----  Contact: [facility] pharmacy  [facility] pharmacy would like to consult with a nurse in regards to prescription request they are needing clarification on the patient medication "Forteo". Please give a call back [phone]. Thanks r/s     "

## 2022-10-20 NOTE — TELEPHONE ENCOUNTER
----- Message from Marti Riley sent at 10/20/2022  1:45 PM CDT -----  Contact: Pt [phone]  Pt called in regards to speaking with the nurse she states she has been waiting for a call since yesterday she called in with patient relations on the line please advise

## 2022-10-25 ENCOUNTER — OFFICE VISIT (OUTPATIENT)
Dept: CARDIOLOGY | Facility: CLINIC | Age: 62
End: 2022-10-25
Payer: COMMERCIAL

## 2022-10-25 VITALS
WEIGHT: 90.81 LBS | TEMPERATURE: 98 F | BODY MASS INDEX: 14.59 KG/M2 | OXYGEN SATURATION: 97 % | HEIGHT: 66 IN | HEART RATE: 71 BPM | SYSTOLIC BLOOD PRESSURE: 124 MMHG | DIASTOLIC BLOOD PRESSURE: 80 MMHG | RESPIRATION RATE: 20 BRPM

## 2022-10-25 DIAGNOSIS — R00.2 PALPITATIONS: ICD-10-CM

## 2022-10-25 DIAGNOSIS — I25.10 NONOBSTRUCTIVE ATHEROSCLEROSIS OF CORONARY ARTERY: ICD-10-CM

## 2022-10-25 DIAGNOSIS — I10 ESSENTIAL HYPERTENSION: Primary | ICD-10-CM

## 2022-10-25 PROBLEM — R07.89 ATYPICAL CHEST PAIN: Status: ACTIVE | Noted: 2022-10-25

## 2022-10-25 PROCEDURE — 93005 ELECTROCARDIOGRAM TRACING: CPT

## 2022-10-25 PROCEDURE — 99215 OFFICE O/P EST HI 40 MIN: CPT | Mod: PBBFAC | Performed by: INTERNAL MEDICINE

## 2022-10-25 NOTE — LETTER
October 25, 2022      [facility]  [facility] [facility] [address]  Phone: [phone]       Patient: Gayle Rhodes   YOB: 1960  Date of Visit: 10/25/2022    To Whom It May Concern:    Jonathan Rhodes  was at [facility] on 10/25/2022. The patient may return to work/school on 11/14/2022 with no restrictions. If you have any questions or concerns, or if I can be of further assistance, please do not hesitate to contact me.    Sincerely,    Saundra Hand MD

## 2022-10-25 NOTE — PROGRESS NOTES
Cardiology  CLINIC NOTE    Patient Name: Gayle Rhodes  YOB: 1960  Chief Complaint: initial visit s/p LHC 9/28 in [place] denies chest maria m     PRESENTING HISTORY   History of Present Illness:  Ms. Gayle hRodes is a 62 y.o. female w/ PMH HTN, anxiety, GERD, DJD, tobacco use, and osteoporosis presents for initial visit.   Patient referred after hospital stay on 09/27/2022 in [place].  Patient was admitted at that time for weakness and heart palpitations.  She states that she does not remember much about her admission except that she felt extremely weak as if she were about to pass out.  She denies previous episodes Carter history of arrhythmias or chest pain.  Patient was noted to have elevated troponins. LHC done on  09/28 that showed multivessel nonobstructive CAD. No intervention for plan for medical management and pt was discharged on atorvastatin 40mg, ASA 81mg, and Lexapro for her anxiety. She was advised to continue Verapamil 240mg BID for HTN. Family history significant for stroke in father and sister.     Today, she denies further episodes of CP. No SOB. She states occasional palpitations when she lays down. She's had this since age 27; was placed on medication for it but stopped due to side effects. Pt works for [employer] and has been very stressed at work. Recently has been working 20 months straight. Also very tearful on exam as her sister recently passed away. She is signed up for a smoking cessation class.     Echo 9/2022:  EF 60-65%  No wall motion abnormalities     CCTA heart/coronary 9/2022:  Four vessel coronary atherosclerosis which appeared to be nonobstructive   Ascending to descending aorta with moderate changes  LM: noncalcified plaque at ostium of left main with no significant luminal narrowing  LAD: mild calcified plaque with no significant luminal narrowing  LCX: partially calcified plaque in proximal circumflex with estimated 25-50% stenosis  RCA: severe  calcified plaque in proximal right right coronary artery with estimated 50-70% stenosis    [facility] 9/2022:  Left main: normal  LAD: normal  LCX: normal  RCA: 50% stenosis in proximal right coronary artery with diffuse narrowing  Impression: nonobstructive RCA disease; medical management    Review of Systems:  12 point review of symptoms negative unless otherwise stated above    PAST HISTORY:     Past Medical History:   Diagnosis Date    Anxiety     Unspecified essential hypertension         Past Surgical History:   Procedure Laterality Date    ADENOIDECTOMY      APPENDECTOMY  5/15/15    BREAST SURGERY      MANDIBLE SURGERY      TONSILLECTOMY         Family History   Problem Relation Age of Onset    HIV Mother     Cancer Father     Hypertension Sister     Hypertension Brother     Diabetes Maternal Grandmother        Social History     Socioeconomic History    Marital status:    Tobacco Use    Smoking status: Every Day     Packs/day: 0.25     Types: Cigarettes    Smokeless tobacco: Never   Substance and Sexual Activity    Alcohol use: Yes     Comment: Social    Drug use: No    Sexual activity: Yes     Partners: Male   Social History Narrative     cigar smoker, no pets.       MEDICATIONS:     Current Outpatient Medications   Medication Instructions    ALPRAZolam (XANAX) 0.5 MG tablet TAKE 1 TABLET BY MOUTH TWICE DAILY AS NEEDED    aspirin (ECOTRIN) 81 mg, Oral, Daily    atorvastatin (LIPITOR) 40 mg, Oral, Daily    CALCIUM CARB/VIT D3/MINERALS (CALCIUM-VITAMIN D ORAL) No dose, route, or frequency recorded.    cyclobenzaprine (FLEXERIL) 5 mg, Oral, Nightly PRN    ergocalciferol (ERGOCALCIFEROL) 50,000 Units, Oral, Every 7 days    EScitalopram oxalate (LEXAPRO) 10 mg, Oral, Daily    folic acid/multivit-min/lutein (CENTRUM SILVER ORAL) 1 tablet, Oral, Daily    FORTEO 20 mcg, Subcutaneous, Daily    ibuprofen (ADVIL,MOTRIN) 600 MG tablet ibuprofen Take 1 tablet (oral) 3 times per day for 10 days 20210628 tablet 3  "times per day oral 10 days active 600 MG    ofloxacin (FLOXIN) 0.3 % otic solution ofloxacin Apply 10 drops (otic (ear)) 1 time per day for 7 days [id number] drops 1 time per day otic (ear) 7 days active 0.3 %    omeprazole (PRILOSEC) 20 mg, Oral, Daily    traMADol (ULTRAM) 50 mg tablet 1 tablet, Oral, Every 8 hours PRN    verapamiL (VERELAN) 240 mg, Oral, 2 times daily        OBJECTIVE:   Vital Signs:  Vitals:    10/25/22 0917   BP: 124/80   Pulse: 71   Resp: 20   Temp: 98.4 °F (36.9 °C)   TempSrc: Oral   SpO2: 97%   Weight: 41.2 kg (90 lb 13.3 oz)   Height: 5' 6.02" (1.677 m)        Physical Exam:  General: Awake, alert, & oriented to person, place & time. No acute distress. Very thin female  Psychiatric: Tearful on exam.  HEENT: Normocephalic, atraumatic. Face symmetric.  Cardiovascular: Regular rate & rhythm. Normal S1 & S2 w/out murmurs, rubs or gallops.  No JVD appreciated.  2+ pulses throughout.  Pulmonary: Bilateral symmetric chest rise. Non-labored, no wheezes, rhonchi or crackles are appreciated.  Abdominal:  Soft, nontender, nondistended. Bowel sounds present  Extremities: No clubbing, cyanosis or edema.  Skin:  Exposed skin is warm & dry.  Neuro:   Patient moves all extremities equally. Sensation intact bilateraly.        ASSESSMENT & PLAN:     Nonobstructive CAD  -EF 60-65% on echo 9/2022  -[facility] with nonobstructive RCA disease   -Pt recent event appears to be more vasovagal in nature  -Continue medical management with atorvastatin 40mg, ASA 81 daily    Palpitations  -intermittent palpitations  -pulse controlled at 71 today; no noticeable irregular rhythm on exam  -will order 2 1/2 week event monitor    HTN  -well controlled today 124/80  -continue verapamil 240mg BID     HLD   -Lipid panel 9/2022  --LDL 96.6  --Triglycerides 87  --Cholesterol 179  --HDL 68  --VLDL  17  -continue atorvastatin 40mg daily    Tobacco use  -encouraged smoking cessation   -pt to start class soon    RTC in 4 months  2 1/2 week " event monitor ordered for palpitations  Continue current medical therapy    Rosy Chaves MD  PGY-3, Internal Medicine

## 2022-11-11 ENCOUNTER — TELEPHONE (OUTPATIENT)
Dept: RHEUMATOLOGY | Facility: CLINIC | Age: 62
End: 2022-11-11
Payer: COMMERCIAL

## 2022-11-11 DIAGNOSIS — R11.0 NAUSEA: Primary | ICD-10-CM

## 2022-11-11 RX ORDER — ONDANSETRON 4 MG/1
4 TABLET, ORALLY DISINTEGRATING ORAL EVERY 8 HOURS PRN
Qty: 12 TABLET | Refills: 0 | Status: SHIPPED | OUTPATIENT
Start: 2022-11-11

## 2022-11-11 NOTE — TELEPHONE ENCOUNTER
Informed patient per Reina,     Please let her know that these are common side effects- we will send in something for nausea. Have her take the injection at bedtime if she is not already doing so. She can also try taking the injection every other day for a few weeks before increasing back to every night.       Patient wants to know if you could take her off from work two to three weeks due to her having a hard time getting adjusted with medication

## 2022-11-11 NOTE — TELEPHONE ENCOUNTER
----- Message from Wen Amato sent at 11/11/2022  1:44 PM CST -----  Contact: Gayle Araujo would like a call back at [phone], regards to getting some of the side affects she is having coming from the injection.     Thanks  Td

## 2022-11-11 NOTE — TELEPHONE ENCOUNTER
Patient called and stated that she is having side effects from the Forteo.....    Dizziness  Nausea  Fatigue      Patient takes it at the same time everyday. Patient wants to know how long side effects will last and is this normal.       Please advise

## 2022-11-11 NOTE — TELEPHONE ENCOUNTER
Please let her know that these are common side effects- we will send in something for nausea. Have her take the injection at bedtime if she is not already doing so. She can also try taking the injection every other day for a few weeks before increasing back to every night.

## 2022-11-14 NOTE — TELEPHONE ENCOUNTER
Im OK with doing a few days but if she feels bad enough that she cant go for 2-3 weeks would change therapy.

## 2022-11-29 ENCOUNTER — TELEPHONE (OUTPATIENT)
Dept: INTERNAL MEDICINE | Facility: CLINIC | Age: 62
End: 2022-11-29
Payer: COMMERCIAL

## 2022-12-06 NOTE — PROGRESS NOTES
Cardiology attending addendum  Patient was seen and examined with Dr. Gonzalez. Agree with plan as outlined above.     Saundra Hand MD  Cardiology-[facility]

## 2022-12-16 ENCOUNTER — PATIENT OUTREACH (OUTPATIENT)
Dept: ADMINISTRATIVE | Facility: HOSPITAL | Age: 62
End: 2022-12-16
Payer: MEDICAID

## 2022-12-16 NOTE — PROGRESS NOTES
Pap non compliant report: Attempted to contact patient to schedule overdue pap smear, no answer, left voicemail.

## 2022-12-28 DIAGNOSIS — K21.9 GASTROESOPHAGEAL REFLUX DISEASE: ICD-10-CM

## 2022-12-28 DIAGNOSIS — F41.9 ANXIETY: ICD-10-CM

## 2022-12-28 DIAGNOSIS — I70.90 ATHEROSCLEROSIS: ICD-10-CM

## 2022-12-28 RX ORDER — VERAPAMIL HYDROCHLORIDE 240 MG/1
CAPSULE, EXTENDED RELEASE ORAL
Qty: 180 CAPSULE | Refills: 3 | Status: SHIPPED | OUTPATIENT
Start: 2022-12-28 | End: 2023-07-18 | Stop reason: SDUPTHER

## 2022-12-28 RX ORDER — ALPRAZOLAM 0.5 MG/1
TABLET ORAL
Qty: 60 TABLET | Refills: 0 | Status: SHIPPED | OUTPATIENT
Start: 2022-12-28 | End: 2023-02-03 | Stop reason: SDUPTHER

## 2022-12-28 RX ORDER — OMEPRAZOLE 20 MG/1
20 CAPSULE, DELAYED RELEASE ORAL DAILY
Qty: 90 CAPSULE | Refills: 3 | Status: SHIPPED | OUTPATIENT
Start: 2022-12-28 | End: 2023-07-18 | Stop reason: SDUPTHER

## 2022-12-28 NOTE — TELEPHONE ENCOUNTER
Spoke with pt regarding refill requests and notified her I would be sending the request to the provider./DT

## 2022-12-28 NOTE — TELEPHONE ENCOUNTER
Care Due:                  Date            Visit Type   Department     Provider  --------------------------------------------------------------------------------                                EP -                              PRIMARY      [facility] INTER[facility]  Last Visit: 12-      CARE ([facility])   MEDICINE       Lisandro Carolina  Next Visit: None Scheduled  None         None Found                                                            Last  Test          Frequency    Reason                     Performed    Due Date  --------------------------------------------------------------------------------    Office Visit  12 months..  omeprazole, verapamiL....  12- 12-    [facility] Embedded Care Gaps. Reference number: [id number]. 12/28/2022   10:19:16 AM CST

## 2022-12-29 ENCOUNTER — TELEPHONE (OUTPATIENT)
Dept: INTERNAL MEDICINE | Facility: CLINIC | Age: 62
End: 2022-12-29
Payer: MEDICAID

## 2022-12-29 NOTE — TELEPHONE ENCOUNTER
----- Message from Candie Supa sent at 12/29/2022  3:58 PM CST -----  Contact: [facility]  Type:  Pharmacy Calling to Clarify an RX    Name of Caller:Rosette  Pharmacy Name:[facility]   Prescription Name:verapamiL (VERELAN) 240 MG C24P  What do they need to clarify?:Prior authorization  Best Call Back Number:[phone]  Additional Information: Pharmacy reports a prior authorization is needed for prescription by calling the authorization line at [phone]; provided ID is [id number]. If not, pharmacy reports an alternative prescription can be requested.  Thank you,  GH

## 2022-12-29 NOTE — TELEPHONE ENCOUNTER
----- Message from Dania Deshpande sent at 12/29/2022  3:05 PM CST -----  .Type:  Patient Returning Call    Who Called:.Gayle Rhodes   Who Left Message for Patient:Christiano  Does the patient know what this is regarding?:  Would the patient rather a call back or a response via [facility]? Call back  Best Call Back Number:.[phone]   Additional Information:

## 2022-12-29 NOTE — TELEPHONE ENCOUNTER
----- Message from Virgen Weiss sent at 12/29/2022 10:13 AM CST -----  Luli RIVERA from [facility]      verapamiL (VERELAN) 240 MG C24P    ALPRAZolam (XANAX) 0.5 MG tablet  Both of these medications need a prior authorization in order for patient to , phone number [phone] to pharmacy

## 2022-12-29 NOTE — TELEPHONE ENCOUNTER
----- Message from Wen Amato sent at 12/29/2022 12:06 PM CST -----  Contact: Gayle Araujo missed a call and would like a call back at [phone], Regards to the medication for her blood pressure.    Thanks  Td

## 2023-01-03 ENCOUNTER — TELEPHONE (OUTPATIENT)
Dept: INTERNAL MEDICINE | Facility: CLINIC | Age: 63
End: 2023-01-03
Payer: MEDICAID

## 2023-01-03 NOTE — TELEPHONE ENCOUNTER
Attempted to complete PA over the phone. Computer moving very slow at the time, not responding. Also could not locate ICD could in any of pts encounters with provider.

## 2023-01-03 NOTE — TELEPHONE ENCOUNTER
----- Message from Candie Cowart sent at 12/29/2022  4:22 PM CST -----  Contact: [facility]  Type:  Pharmacy Calling to Clarify an RX     Name of Caller:Rosette   Pharmacy Name:[facility]   Prescription Name:verapamiL (VERELAN) 240 MG C24P   What do they need to clarify?:Prior authorization   Best Call Back Number:[phone]   Additional Information: Pharmacy reports a prior authorization is needed for prescription by calling the authorization line at [phone]; provided ID is [id number]. If not, pharmacy reports prescription can be changed.     (Phone number has been updated to correct number, I apologize for the inconvenience)     Thank you,   GH

## 2023-01-25 ENCOUNTER — PATIENT MESSAGE (OUTPATIENT)
Dept: ADMINISTRATIVE | Facility: HOSPITAL | Age: 63
End: 2023-01-25
Payer: MEDICAID

## 2023-01-31 DIAGNOSIS — F41.9 ANXIETY: ICD-10-CM

## 2023-02-01 RX ORDER — ALPRAZOLAM 0.5 MG/1
TABLET ORAL
Qty: 60 TABLET | OUTPATIENT
Start: 2023-02-01

## 2023-02-02 ENCOUNTER — PATIENT MESSAGE (OUTPATIENT)
Dept: INTERNAL MEDICINE | Facility: CLINIC | Age: 63
End: 2023-02-02
Payer: MEDICAID

## 2023-02-02 NOTE — TELEPHONE ENCOUNTER
Pt states she is in [place]. 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 Pt states Dr. Carolina knows her situation and normally calls the medication in for her because he is aware of her situation. Pt states she came in in October and did everything needed, so why cant she get this medication filled by provider she saw. Informed pt I will asked both Kristofer rincon NP who she saw in October as well as DR. Knutson NP, [facility].

## 2023-02-02 NOTE — TELEPHONE ENCOUNTER
Informed pt of provider NP, Kristofer rg response. Informed pt NPRock comes in at 10 am, I will inform her of Providers response once request is reviewed.

## 2023-02-03 DIAGNOSIS — F41.9 ANXIETY: ICD-10-CM

## 2023-02-03 RX ORDER — ALPRAZOLAM 0.5 MG/1
0.5 TABLET ORAL 2 TIMES DAILY PRN
Qty: 60 TABLET | Refills: 4 | Status: SHIPPED | OUTPATIENT
Start: 2023-02-03 | End: 2023-09-29

## 2023-04-13 DIAGNOSIS — F41.9 ANXIETY: ICD-10-CM

## 2023-04-17 ENCOUNTER — TELEPHONE (OUTPATIENT)
Dept: INTERNAL MEDICINE | Facility: CLINIC | Age: 63
End: 2023-04-17
Payer: COMMERCIAL

## 2023-04-17 RX ORDER — ESCITALOPRAM OXALATE 10 MG/1
TABLET ORAL
Qty: 90 TABLET | Refills: 0 | Status: SHIPPED | OUTPATIENT
Start: 2023-04-17 | End: 2023-07-18 | Stop reason: SDUPTHER

## 2023-04-17 NOTE — TELEPHONE ENCOUNTER
Contact pt regarding appointment, patient stated she been out of town, she will make an appointment when she get back home. SANCHO

## 2023-04-19 ENCOUNTER — PATIENT MESSAGE (OUTPATIENT)
Dept: ADMINISTRATIVE | Facility: HOSPITAL | Age: 63
End: 2023-04-19
Payer: COMMERCIAL

## 2023-04-28 ENCOUNTER — TELEPHONE (OUTPATIENT)
Dept: INTERNAL MEDICINE | Facility: CLINIC | Age: 63
End: 2023-04-28
Payer: COMMERCIAL

## 2023-04-28 NOTE — LETTER
May 1, 2023      [facility] [address]  [address] [address] [facility]  [place] [address]  Phone: [phone]  Fax: [phone]       Patient: Gayle Rhodes   YOB: 1960    To Whom It May Concern:    Gayle Rhodes is a patient currently under my care, including her last office visit with physical exam. Due to her health history, specifically her chronic back condition, Gayle is unable to sit in low/compact-style cars and needs higher body positioning of an SUV/truck or equivalent.     Please don't hesitate to call our office if you have any questions.        Sincerely,           Dr. MEHNAZ Carolina M.D.

## 2023-05-01 ENCOUNTER — PATIENT MESSAGE (OUTPATIENT)
Dept: INTERNAL MEDICINE | Facility: CLINIC | Age: 63
End: 2023-05-01
Payer: COMMERCIAL

## 2023-05-01 NOTE — LETTER
May 3, 2023      [facility] [address]  [address] [address] [facility]  [place] [address]  Phone: [phone]  Fax: [phone]       Patient: Gayle Rhodes   YOB: 1960    To Whom It May Concern:    Gayle Rhodes is a patient currently under my care, including her last office visit with physical exam. Due to her health history, specifically her chronic back condition, Gayle is unable to sit in low/compact-style cars and needs higher body positioning of an SUV/truck or equivalent. 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 Please make accommodations of needed equipment such as ergonomic chair, large monitor, computer stand and mouse/keyboard.       Sincerely,      MEHNAZ Carolina MD

## 2023-05-03 NOTE — TELEPHONE ENCOUNTER
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 Please make accommodations of needed equipment with her request listed out.

## 2023-06-20 ENCOUNTER — TELEPHONE (OUTPATIENT)
Dept: INTERNAL MEDICINE | Facility: CLINIC | Age: 63
End: 2023-06-20
Payer: COMMERCIAL

## 2023-06-21 ENCOUNTER — TELEPHONE (OUTPATIENT)
Dept: RHEUMATOLOGY | Facility: CLINIC | Age: 63
End: 2023-06-21
Payer: COMMERCIAL

## 2023-06-21 NOTE — TELEPHONE ENCOUNTER
----- Message from Moses Wallis MA sent at 6/20/2023  4:55 PM CDT -----    ----- Message -----  From: Daniel Lester  Sent: 6/20/2023   4:51 PM CDT  To: Teresa Huang Staff        Name of Who is Calling:PT          What is the request in detail:PT is requesting a call back to discuss an appointment as soon as possible to get her shot she stated. Please be Advised!          Can the clinic reply by [facility]:no          What Number to Call Back if not in [phone]

## 2023-06-21 NOTE — TELEPHONE ENCOUNTER
Attempted to contact pt regarding scheduling a f/u appt.  Number listed for return call is not in service.   Contacted alternative number, call was unsuccessful, lvm

## 2023-06-23 ENCOUNTER — TELEPHONE (OUTPATIENT)
Dept: CARDIOLOGY | Facility: CLINIC | Age: 63
End: 2023-06-23
Payer: COMMERCIAL

## 2023-06-23 NOTE — TELEPHONE ENCOUNTER
Message left on patient's voicemail for her to call to make an appointment as she has missed the last 2 appointments and has no future appointment currently. Also informed her that atorvastatin is the only medication on her list that cardiology would refill.

## 2023-07-18 ENCOUNTER — OFFICE VISIT (OUTPATIENT)
Dept: INTERNAL MEDICINE | Facility: CLINIC | Age: 63
End: 2023-07-18
Payer: COMMERCIAL

## 2023-07-18 VITALS
HEIGHT: 66 IN | WEIGHT: 95.69 LBS | TEMPERATURE: 97 F | OXYGEN SATURATION: 98 % | HEART RATE: 65 BPM | BODY MASS INDEX: 15.38 KG/M2 | DIASTOLIC BLOOD PRESSURE: 66 MMHG | SYSTOLIC BLOOD PRESSURE: 112 MMHG | RESPIRATION RATE: 16 BRPM

## 2023-07-18 DIAGNOSIS — Z00.00 WELL ADULT EXAM: Primary | ICD-10-CM

## 2023-07-18 DIAGNOSIS — I70.90 ATHEROSCLEROSIS: ICD-10-CM

## 2023-07-18 DIAGNOSIS — F41.9 ANXIETY: ICD-10-CM

## 2023-07-18 DIAGNOSIS — I25.10 NONOBSTRUCTIVE ATHEROSCLEROSIS OF CORONARY ARTERY: ICD-10-CM

## 2023-07-18 DIAGNOSIS — F17.200 TOBACCO DEPENDENCE: ICD-10-CM

## 2023-07-18 DIAGNOSIS — Z12.11 COLON CANCER SCREENING: ICD-10-CM

## 2023-07-18 DIAGNOSIS — M15.9 PRIMARY OSTEOARTHRITIS INVOLVING MULTIPLE JOINTS: ICD-10-CM

## 2023-07-18 DIAGNOSIS — K21.9 GASTROESOPHAGEAL REFLUX DISEASE: ICD-10-CM

## 2023-07-18 DIAGNOSIS — I10 ESSENTIAL HYPERTENSION: ICD-10-CM

## 2023-07-18 DIAGNOSIS — E55.9 VITAMIN D DEFICIENCY: ICD-10-CM

## 2023-07-18 DIAGNOSIS — Z12.31 ENCOUNTER FOR SCREENING MAMMOGRAM FOR MALIGNANT NEOPLASM OF BREAST: ICD-10-CM

## 2023-07-18 DIAGNOSIS — M81.0 AGE-RELATED OSTEOPOROSIS WITHOUT CURRENT PATHOLOGICAL FRACTURE: ICD-10-CM

## 2023-07-18 DIAGNOSIS — K21.9 GASTROESOPHAGEAL REFLUX DISEASE WITHOUT ESOPHAGITIS: ICD-10-CM

## 2023-07-18 PROCEDURE — 1160F PR REVIEW ALL MEDS BY PRESCRIBER/CLIN PHARMACIST DOCUMENTED: ICD-10-PCS | Mod: CPTII,S$GLB,, | Performed by: PEDIATRICS

## 2023-07-18 PROCEDURE — 99999 PR PBB SHADOW E&M-EST. PATIENT-LVL V: CPT | Mod: PBBFAC,,, | Performed by: PEDIATRICS

## 2023-07-18 PROCEDURE — 99215 OFFICE O/P EST HI 40 MIN: CPT | Mod: PBBFAC | Performed by: PEDIATRICS

## 2023-07-18 PROCEDURE — 1159F PR MEDICATION LIST DOCUMENTED IN MEDICAL RECORD: ICD-10-PCS | Mod: CPTII,S$GLB,, | Performed by: PEDIATRICS

## 2023-07-18 PROCEDURE — 3078F DIAST BP <80 MM HG: CPT | Mod: CPTII,S$GLB,, | Performed by: PEDIATRICS

## 2023-07-18 PROCEDURE — 3008F BODY MASS INDEX DOCD: CPT | Mod: CPTII,S$GLB,, | Performed by: PEDIATRICS

## 2023-07-18 PROCEDURE — 3008F PR BODY MASS INDEX (BMI) DOCUMENTED: ICD-10-PCS | Mod: CPTII,S$GLB,, | Performed by: PEDIATRICS

## 2023-07-18 PROCEDURE — 99396 PR PREVENTIVE VISIT,EST,40-64: ICD-10-PCS | Mod: S$GLB,,, | Performed by: PEDIATRICS

## 2023-07-18 PROCEDURE — 3074F PR MOST RECENT SYSTOLIC BLOOD PRESSURE < 130 MM HG: ICD-10-PCS | Mod: CPTII,S$GLB,, | Performed by: PEDIATRICS

## 2023-07-18 PROCEDURE — 3074F SYST BP LT 130 MM HG: CPT | Mod: CPTII,S$GLB,, | Performed by: PEDIATRICS

## 2023-07-18 PROCEDURE — 99396 PREV VISIT EST AGE 40-64: CPT | Mod: S$GLB,,, | Performed by: PEDIATRICS

## 2023-07-18 PROCEDURE — 1159F MED LIST DOCD IN RCRD: CPT | Mod: CPTII,S$GLB,, | Performed by: PEDIATRICS

## 2023-07-18 PROCEDURE — 1160F RVW MEDS BY RX/DR IN RCRD: CPT | Mod: CPTII,S$GLB,, | Performed by: PEDIATRICS

## 2023-07-18 PROCEDURE — 99999 PR PBB SHADOW E&M-EST. PATIENT-LVL V: ICD-10-PCS | Mod: PBBFAC,,, | Performed by: PEDIATRICS

## 2023-07-18 PROCEDURE — 3078F PR MOST RECENT DIASTOLIC BLOOD PRESSURE < 80 MM HG: ICD-10-PCS | Mod: CPTII,S$GLB,, | Performed by: PEDIATRICS

## 2023-07-18 RX ORDER — OMEPRAZOLE 20 MG/1
20 CAPSULE, DELAYED RELEASE ORAL DAILY
Qty: 90 CAPSULE | Refills: 3 | Status: SHIPPED | OUTPATIENT
Start: 2023-07-18

## 2023-07-18 RX ORDER — ASPIRIN 81 MG/1
81 TABLET ORAL DAILY
Qty: 90 TABLET | Refills: 3 | Status: SHIPPED | OUTPATIENT
Start: 2023-07-18

## 2023-07-18 RX ORDER — ESCITALOPRAM OXALATE 10 MG/1
10 TABLET ORAL DAILY
Qty: 90 TABLET | Refills: 3 | Status: SHIPPED | OUTPATIENT
Start: 2023-07-18 | End: 2023-12-05 | Stop reason: SDUPTHER

## 2023-07-18 RX ORDER — ERGOCALCIFEROL 1.25 MG/1
50000 CAPSULE ORAL
Qty: 12 CAPSULE | Refills: 3 | Status: SHIPPED | OUTPATIENT
Start: 2023-07-18

## 2023-07-18 RX ORDER — ATORVASTATIN CALCIUM 40 MG/1
40 TABLET, FILM COATED ORAL DAILY
Qty: 90 TABLET | Refills: 3 | Status: SHIPPED | OUTPATIENT
Start: 2023-07-18

## 2023-07-18 RX ORDER — VERAPAMIL HYDROCHLORIDE 240 MG/1
CAPSULE, EXTENDED RELEASE ORAL
Qty: 180 CAPSULE | Refills: 3 | Status: SHIPPED | OUTPATIENT
Start: 2023-07-18 | End: 2024-01-09 | Stop reason: SDUPTHER

## 2023-07-18 NOTE — PROGRESS NOTES
Subjective     Patient ID: Gayle Rhodes is a 63 y.o. female.    Chief Complaint: Follow-up    Gayle Rhodes is a 63 y.o. female who presents to the clinic for annual. 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       1)Anxiety: on xanax and lexapro, still has sxs has never recovered from the death of her sister, has not had time to go to counseling    2)HTN: HTN: B/P good, no HTNive symptoms. normal home monitoring, readings  3)Nonobstructive atherosclerosis of coronary artery: follow up with cardiology in September, no CP or SOB  4)GERD: quiet on PPI  5)Osteoporosis/osteoarthritis: needs follow up with rheumatology, on vitamin D supplementation and Forteo   6)Tobacco dependence: still smoking             LABS REVIEWED: PTH, VitD, CMP, CBC, and lipid panel     Review of Systems   Constitutional:  Negative for chills, diaphoresis, fatigue and fever.   Respiratory:  Negative for cough and shortness of breath.    Cardiovascular:  Negative for chest pain.   Gastrointestinal:  Negative for abdominal pain, anal bleeding, constipation, diarrhea, nausea and vomiting.   Endocrine: Negative for cold intolerance, heat intolerance, polydipsia, polyphagia and polyuria.   Musculoskeletal:  Positive for arthralgias (chronic R hand injury, static) and back pain.   Neurological:  Positive for coordination difficulties (hand injury) and coordination difficulties (hand injury).   Psychiatric/Behavioral:  Positive for dysphoric mood. Negative for self-injury, sleep disturbance and suicidal ideas. The patient is nervous/anxious.    All other systems reviewed and are negative.       Objective     Physical Exam  Constitutional:       General: She is not in acute distress.     Appearance: Normal appearance. She is not ill-appearing or toxic-appearing.      Comments: thin   Cardiovascular:      Rate and Rhythm: Normal rate and regular rhythm.      Pulses: Normal pulses.      Heart sounds: Normal  heart sounds. No murmur heard.    No friction rub.   Pulmonary:      Effort: Pulmonary effort is normal.      Breath sounds: Normal breath sounds.   Abdominal:      General: There is no distension.      Palpations: There is no mass.      Tenderness: There is no abdominal tenderness. There is no guarding or rebound.      Hernia: No hernia is present.   Neurological:      Mental Status: She is alert and oriented to person, place, and time.   Psychiatric:         Mood and Affect: Mood normal.         Behavior: Behavior normal.         Thought Content: Thought content normal.         Judgment: Judgment normal.          Assessment and Plan     1. Well adult exam    2. Tobacco dependence    3. Nonobstructive atherosclerosis of coronary artery  -     Lipid Panel; Future; Expected date: 07/18/2023  -     Comprehensive Metabolic Panel; Future; Expected date: 07/18/2023    4. Primary osteoarthritis involving multiple joints    5. Gastroesophageal reflux disease without esophagitis    6. Essential hypertension    7. Anxiety  -     EScitalopram oxalate (LEXAPRO) 10 MG tablet; Take 1 tablet (10 mg total) by mouth once daily.  Dispense: 90 tablet; Refill: 3    8. Age-related osteoporosis without current pathological fracture  -     Ambulatory referral/consult to Rheumatology; Future; Expected date: 07/25/2023    9. Atherosclerosis  -     aspirin (ECOTRIN) 81 MG EC tablet; Take 1 tablet (81 mg total) by mouth once daily.  Dispense: 90 tablet; Refill: 3  -     atorvastatin (LIPITOR) 40 MG tablet; Take 1 tablet (40 mg total) by mouth once daily.  Dispense: 90 tablet; Refill: 3    10. Gastroesophageal reflux disease  -     omeprazole (PRILOSEC) 20 MG capsule; Take 1 capsule (20 mg total) by mouth once daily.  Dispense: 90 capsule; Refill: 3    11. Vitamin D deficiency  -     ergocalciferol (ERGOCALCIFEROL) 50,000 unit Cap; Take 1 capsule (50,000 Units total) by mouth every 7 days.  Dispense: 12 capsule; Refill: 3    12. Colon cancer  screening  -     Ambulatory referral/consult to Endo Procedure ; Future; Expected date: 07/19/2023    13. Encounter for screening mammogram for malignant neoplasm of breast  -     Mammo Digital Screening Bilat w/ Ozzie; Future; Expected date: 07/18/2023  -     Ambulatory referral/consult to Gynecology; Future; Expected date: 07/25/2023    Other orders  -     verapamiL (VERELAN) 240 MG C24P; TAKE 1 CAPSULE(240 MG) BY MOUTH TWICE DAILY  Dispense: 180 capsule; Refill: 3        Due for colonoscopy. Shingles vaccine discussed. Referral to rheumatology and due for mammogram. BP, BS, lipids at goal from last set of labs. Stop smoking. Follow up in 6 months with labs.          Follow up in about 6 months (around 1/18/2024).

## 2023-09-07 ENCOUNTER — PATIENT MESSAGE (OUTPATIENT)
Dept: RESEARCH | Facility: HOSPITAL | Age: 63
End: 2023-09-07
Payer: COMMERCIAL

## 2023-09-28 DIAGNOSIS — F41.9 ANXIETY: ICD-10-CM

## 2023-09-29 RX ORDER — ALPRAZOLAM 0.5 MG/1
0.5 TABLET ORAL 2 TIMES DAILY
Qty: 60 TABLET | Refills: 5 | Status: SHIPPED | OUTPATIENT
Start: 2023-09-29

## 2023-11-21 ENCOUNTER — TELEPHONE (OUTPATIENT)
Dept: INTERNAL MEDICINE | Facility: CLINIC | Age: 63
End: 2023-11-21
Payer: COMMERCIAL

## 2023-11-21 NOTE — TELEPHONE ENCOUNTER
Spoke with Lisandro at [facility] and he stated that patient already had an appointment scheduled with her orthopedic doctor for 11/30/23 and needed a different day for Dr. Carolina's appointment. Assisted him in scheduling her an appointment with Dr. Carolina on 12/1/23.

## 2023-11-21 NOTE — TELEPHONE ENCOUNTER
----- Message from Paris Ireland sent at 11/21/2023  2:57 PM CST -----  Contact: [facility]  ..Type:  Sooner Apoointment Request    Caller is requesting a sooner appointment.  Caller declined first available appointment listed below.  Caller will not accept being placed on the waitlist and is requesting a message be sent to doctor.  Name of Caller:Edralph   When is the first available appointment?  Symptoms:discharged from Rehab on   Would the patient rather a call back or a response via [facility]?  Call back   Best Call Back Number:.[phone]    Additional Information:

## 2023-11-21 NOTE — TELEPHONE ENCOUNTER
----- Message from Katy Lester sent at 11/21/2023  3:18 PM CST -----  Lisandro with [facility] called in this afternoon needing to reschedule the patient follow up appointment after being discharged. Please call back [phone]

## 2023-11-21 NOTE — TELEPHONE ENCOUNTER
Spoke with Lisandro at [facility] and he stated that patient is being discharged from them and needs a follow up with Dr. Carolina. Assisted in scheduling an appointment on 11/30/23 for patient.

## 2023-11-24 ENCOUNTER — TELEPHONE (OUTPATIENT)
Dept: INTERNAL MEDICINE | Facility: CLINIC | Age: 63
End: 2023-11-24
Payer: COMMERCIAL

## 2023-11-24 NOTE — TELEPHONE ENCOUNTER
Called and left a message for patient that Dr. Carolina is out of the office until next week and that I will route her message to him.

## 2023-11-24 NOTE — TELEPHONE ENCOUNTER
----- Message from Charisse Scanlon sent at 11/24/2023  9:49 AM CST -----  Contact: Gayle Aguilar is returning a missed call to the nurse. Please call patient at [phone] Patient states she is now available .

## 2023-11-24 NOTE — TELEPHONE ENCOUNTER
----- Message from Luci Friedman sent at 11/24/2023  1:28 PM CST -----  Contact: Gayle  Type:  Patient Returning Call    Who Called:Gayle  Who Left Message for Patient:Saji  Does the patient know what this is regarding?:missed call  Would the patient rather a call back or a response via [facility]? Call back  Best Call Back Number:  Additional Information: Gayle missed a call and would like a call back    Thanks  DENIS

## 2023-11-24 NOTE — TELEPHONE ENCOUNTER
----- Message from Sonali Zhou sent at 11/24/2023 12:29 PM CST -----  Regarding: pt call back  Name of Who is Calling:Pt         What is the request in detail: Pt requesting call back in regards to next available appt please advise             Can the clinic reply by [facility]: yes         What Number to Call Back if not in [facility]:Telephone Information:  Mobile          [phone]

## 2023-11-24 NOTE — TELEPHONE ENCOUNTER
----- Message from Luz Maria Saul sent at 11/24/2023  9:31 AM CST -----  States she broke her femur RT leg and had surgery on 11/11. States she is being Discharged from  [facility] today. States she is in a wheelchair and walker bound, no weight on her RT leg. States she will have home health starting next week, three times a day.     Type:  RX Refill Request    Who Called: pt  Refill or New Rx:Refill  RX Name and Strength:Celebrex and fosamax  How is the patient currently taking it? (ex. 1XDay):?  Is this a 30 day or 90 day RX:?  Preferred Pharmacy with phone number:.  [id number] - [place], [place] - [address] AT [facility] [address]  [address]  [place] [address]  Phone: [phone] Fax: [phone]  Local or Mail Order:Local  Ordering Provider:?  Would the patient rather a call back or a response via [facility]? call  Best Call Back Number:[phone]  Additional Information: States [facility] close today at 7:00 pm and they are closed on the weekend.    Thank you

## 2023-11-28 ENCOUNTER — TELEPHONE (OUTPATIENT)
Dept: INTERNAL MEDICINE | Facility: CLINIC | Age: 63
End: 2023-11-28
Payer: COMMERCIAL

## 2023-11-28 NOTE — TELEPHONE ENCOUNTER
Spoke with patient and informed her per Dr. Carolina that her appointment on 12/5/23 with him can be for both her annual and hospital follow up. She verbalized understanding and cancelled her appointment with him on 1/18/24.

## 2023-11-28 NOTE — TELEPHONE ENCOUNTER
----- Message from Danika Hough sent at 11/28/2023  8:59 AM CST -----  Contact: patient  Gayle Rhodes would like a call back at [phone], in regards to her appt on 12/5/23. She would like to know if she can have both her annual exam, and the hospital f/u done on that same day.

## 2023-12-05 ENCOUNTER — LAB VISIT (OUTPATIENT)
Dept: LAB | Facility: HOSPITAL | Age: 63
End: 2023-12-05
Attending: PEDIATRICS
Payer: COMMERCIAL

## 2023-12-05 ENCOUNTER — OFFICE VISIT (OUTPATIENT)
Dept: INTERNAL MEDICINE | Facility: CLINIC | Age: 63
End: 2023-12-05
Payer: COMMERCIAL

## 2023-12-05 VITALS
HEART RATE: 65 BPM | BODY MASS INDEX: 15.44 KG/M2 | DIASTOLIC BLOOD PRESSURE: 60 MMHG | HEIGHT: 66 IN | RESPIRATION RATE: 16 BRPM | OXYGEN SATURATION: 99 % | SYSTOLIC BLOOD PRESSURE: 118 MMHG | TEMPERATURE: 98 F

## 2023-12-05 DIAGNOSIS — K21.9 GASTROESOPHAGEAL REFLUX DISEASE WITHOUT ESOPHAGITIS: ICD-10-CM

## 2023-12-05 DIAGNOSIS — D50.0 BLOOD LOSS ANEMIA: ICD-10-CM

## 2023-12-05 DIAGNOSIS — F41.9 ANXIETY: ICD-10-CM

## 2023-12-05 DIAGNOSIS — I10 ESSENTIAL HYPERTENSION: ICD-10-CM

## 2023-12-05 DIAGNOSIS — S72.401D CLOSED FRACTURE OF DISTAL END OF RIGHT FEMUR WITH ROUTINE HEALING, UNSPECIFIED FRACTURE MORPHOLOGY, SUBSEQUENT ENCOUNTER: ICD-10-CM

## 2023-12-05 DIAGNOSIS — S72.401D CLOSED FRACTURE OF DISTAL END OF RIGHT FEMUR WITH ROUTINE HEALING, UNSPECIFIED FRACTURE MORPHOLOGY, SUBSEQUENT ENCOUNTER: Primary | ICD-10-CM

## 2023-12-05 DIAGNOSIS — M81.0 AGE-RELATED OSTEOPOROSIS WITHOUT CURRENT PATHOLOGICAL FRACTURE: ICD-10-CM

## 2023-12-05 LAB
ALBUMIN SERPL BCP-MCNC: 3.6 G/DL (ref 3.5–5.2)
ALP SERPL-CCNC: 104 U/L (ref 55–135)
ALT SERPL W/O P-5'-P-CCNC: 16 U/L (ref 10–44)
ANION GAP SERPL CALC-SCNC: 10 MMOL/L (ref 8–16)
AST SERPL-CCNC: 24 U/L (ref 10–40)
BASOPHILS # BLD AUTO: 0.03 K/UL (ref 0–0.2)
BASOPHILS NFR BLD: 0.4 % (ref 0–1.9)
BILIRUB SERPL-MCNC: 0.5 MG/DL (ref 0.1–1)
BUN SERPL-MCNC: 9 MG/DL (ref 8–23)
CALCIUM SERPL-MCNC: 9.3 MG/DL (ref 8.7–10.5)
CHLORIDE SERPL-SCNC: 104 MMOL/L (ref 95–110)
CO2 SERPL-SCNC: 25 MMOL/L (ref 23–29)
CREAT SERPL-MCNC: 0.6 MG/DL (ref 0.5–1.4)
DIFFERENTIAL METHOD: ABNORMAL
EOSINOPHIL # BLD AUTO: 0.1 K/UL (ref 0–0.5)
EOSINOPHIL NFR BLD: 0.8 % (ref 0–8)
ERYTHROCYTE [DISTWIDTH] IN BLOOD BY AUTOMATED COUNT: 13 % (ref 11.5–14.5)
EST. GFR  (NO RACE VARIABLE): >60 ML/MIN/1.73 M^2
GLUCOSE SERPL-MCNC: 76 MG/DL (ref 70–110)
HCT VFR BLD AUTO: 37.2 % (ref 37–48.5)
HGB BLD-MCNC: 12.1 G/DL (ref 12–16)
IMM GRANULOCYTES # BLD AUTO: 0.03 K/UL (ref 0–0.04)
IMM GRANULOCYTES NFR BLD AUTO: 0.4 % (ref 0–0.5)
LYMPHOCYTES # BLD AUTO: 1.9 K/UL (ref 1–4.8)
LYMPHOCYTES NFR BLD: 22.7 % (ref 18–48)
MCH RBC QN AUTO: 32.5 PG (ref 27–31)
MCHC RBC AUTO-ENTMCNC: 32.5 G/DL (ref 32–36)
MCV RBC AUTO: 100 FL (ref 82–98)
MONOCYTES # BLD AUTO: 0.8 K/UL (ref 0.3–1)
MONOCYTES NFR BLD: 9.2 % (ref 4–15)
NEUTROPHILS # BLD AUTO: 5.5 K/UL (ref 1.8–7.7)
NEUTROPHILS NFR BLD: 66.5 % (ref 38–73)
NRBC BLD-RTO: 0 /100 WBC
PLATELET # BLD AUTO: 292 K/UL (ref 150–450)
PMV BLD AUTO: 12 FL (ref 9.2–12.9)
POTASSIUM SERPL-SCNC: 4.8 MMOL/L (ref 3.5–5.1)
PROT SERPL-MCNC: 6.7 G/DL (ref 6–8.4)
RBC # BLD AUTO: 3.72 M/UL (ref 4–5.4)
SODIUM SERPL-SCNC: 139 MMOL/L (ref 136–145)
WBC # BLD AUTO: 8.24 K/UL (ref 3.9–12.7)

## 2023-12-05 PROCEDURE — 85025 COMPLETE CBC W/AUTO DIFF WBC: CPT | Performed by: PEDIATRICS

## 2023-12-05 PROCEDURE — 1159F PR MEDICATION LIST DOCUMENTED IN MEDICAL RECORD: ICD-10-PCS | Mod: CPTII,S$GLB,, | Performed by: PEDIATRICS

## 2023-12-05 PROCEDURE — 99999 PR PBB SHADOW E&M-EST. PATIENT-LVL V: ICD-10-PCS | Mod: PBBFAC,,, | Performed by: PEDIATRICS

## 2023-12-05 PROCEDURE — 1160F PR REVIEW ALL MEDS BY PRESCRIBER/CLIN PHARMACIST DOCUMENTED: ICD-10-PCS | Mod: CPTII,S$GLB,, | Performed by: PEDIATRICS

## 2023-12-05 PROCEDURE — 3078F DIAST BP <80 MM HG: CPT | Mod: CPTII,S$GLB,, | Performed by: PEDIATRICS

## 2023-12-05 PROCEDURE — 1160F RVW MEDS BY RX/DR IN RCRD: CPT | Mod: CPTII,S$GLB,, | Performed by: PEDIATRICS

## 2023-12-05 PROCEDURE — 80053 COMPREHEN METABOLIC PANEL: CPT | Performed by: PEDIATRICS

## 2023-12-05 PROCEDURE — 99999 PR PBB SHADOW E&M-EST. PATIENT-LVL V: CPT | Mod: PBBFAC,,, | Performed by: PEDIATRICS

## 2023-12-05 PROCEDURE — 1159F MED LIST DOCD IN RCRD: CPT | Mod: CPTII,S$GLB,, | Performed by: PEDIATRICS

## 2023-12-05 PROCEDURE — 3078F PR MOST RECENT DIASTOLIC BLOOD PRESSURE < 80 MM HG: ICD-10-PCS | Mod: CPTII,S$GLB,, | Performed by: PEDIATRICS

## 2023-12-05 PROCEDURE — 99214 PR OFFICE/OUTPT VISIT, EST, LEVL IV, 30-39 MIN: ICD-10-PCS | Mod: S$GLB,,, | Performed by: PEDIATRICS

## 2023-12-05 PROCEDURE — 36415 COLL VENOUS BLD VENIPUNCTURE: CPT | Performed by: PEDIATRICS

## 2023-12-05 PROCEDURE — 99214 OFFICE O/P EST MOD 30 MIN: CPT | Mod: S$GLB,,, | Performed by: PEDIATRICS

## 2023-12-05 PROCEDURE — 3074F PR MOST RECENT SYSTOLIC BLOOD PRESSURE < 130 MM HG: ICD-10-PCS | Mod: CPTII,S$GLB,, | Performed by: PEDIATRICS

## 2023-12-05 PROCEDURE — 3074F SYST BP LT 130 MM HG: CPT | Mod: CPTII,S$GLB,, | Performed by: PEDIATRICS

## 2023-12-05 RX ORDER — ESCITALOPRAM OXALATE 20 MG/1
20 TABLET ORAL DAILY
Qty: 90 TABLET | Refills: 3 | Status: SHIPPED | OUTPATIENT
Start: 2023-12-05

## 2023-12-05 NOTE — PROGRESS NOTES
Subjective     Patient ID: Gayle Rhodes is a 63 y.o. female.    Chief Complaint: Hospital Follow Up and Annual Exam    Gayle Rhodes is a 63 y.o. female who presents to the clinic for annual and hospital visit follow up with . Pt went to [facility] for a trip and fall that resulted in R knee pain. Imaging showed closed fracture R distal femur with open reduction, internal fixation by dr Olea on 11/11. At time of discharge, pt was prescribed oxycodone and aspirin was recommended and referred to rehabilitation. Pt went to [facility] from 11/11-11/24. Since visit, pt reports she has been compliant with PT, home health, and instructions given for recovery, progressing well. Is planning to work from home during this time. In addition, she states during her time in rehab lexapro was discontinued but since release she has started medication again. However, feels this situation has been mentally and emotionally taxing due to inactivity and dependency on others.                   Review of Systems   Constitutional:  Negative for chills, diaphoresis, fatigue and fever.   Respiratory:  Negative for cough and shortness of breath.    Cardiovascular:  Negative for chest pain.   Gastrointestinal:  Positive for constipation (mild). Negative for abdominal pain, anal bleeding, diarrhea, nausea and vomiting.   Endocrine: Negative for cold intolerance, heat intolerance, polydipsia, polyphagia and polyuria.   Musculoskeletal:  Positive for leg pain and myalgias.   All other systems reviewed and are negative.         Objective     Physical Exam  Constitutional:       General: She is not in acute distress.     Appearance: Normal appearance. She is not ill-appearing or toxic-appearing.   Cardiovascular:      Rate and Rhythm: Normal rate and regular rhythm.      Pulses: Normal pulses.      Heart sounds: Normal heart sounds. No murmur heard.     No friction rub.   Pulmonary:      Effort: Pulmonary effort is  normal.      Breath sounds: Normal breath sounds.   Abdominal:      General: There is no distension.      Palpations: There is no mass.      Tenderness: There is no abdominal tenderness. There is no guarding or rebound.      Hernia: No hernia is present.   Musculoskeletal:      Comments: R leg in brace and in push chair   Neurological:      Mental Status: She is alert and oriented to person, place, and time.   Psychiatric:         Mood and Affect: Mood normal.         Behavior: Behavior normal.         Thought Content: Thought content normal.         Judgment: Judgment normal.            Assessment and Plan     1. Closed fracture of distal end of right femur with routine healing, unspecified fracture morphology, subsequent encounter  -     Comprehensive Metabolic Panel; Future; Expected date: 12/05/2023    2. Essential hypertension    3. Anxiety  -     EScitalopram oxalate (LEXAPRO) 20 MG tablet; Take 1 tablet (20 mg total) by mouth once daily.  Dispense: 90 tablet; Refill: 3    4. Age-related osteoporosis without current pathological fracture    5. Gastroesophageal reflux disease without esophagitis    6. Blood loss anemia  -     CBC Auto Differential; Future; Expected date: 12/05/2023        Repeat CBC and Chem20 today. Increase lexapro from 10 mg to 20 mg to help manage depression and anxiety sxs. Continue PT and follow up and disability with Dr. Olea as needed. Follow up in 3 months with labs. Flu and RSV vaccinated. Covid vaccine recommended.         Follow up in about 3 months (around 3/5/2024).    I, Jere Nguyen, am scribing for, and in the presence of, Dr. Lisandro Carolina Jr. I performed the above scribed service and the documentation accurately describes the services I performed. I attest to the accuracy of the note.    I, Dr. Lisandro Carolina Jr, reviewed documentation as scribed above. I personally performed the services described in this documentation.  I agree that the record reflects my  personal performance and is accurate and complete. Lisandro Carolina Jr., MD.  12/05/2023    Transitional Care Note    Family and/or Caretaker present at visit?  Yes.  Diagnostic tests reviewed/disposition: I have reviewed all completed as well as pending diagnostic tests at the time of discharge.  Disease/illness education: given  Home health/community services discussion/referrals: Patient has home health established at home through next 1-2 weeks .   Establishment or re-establishment of referral orders for community resources: No other necessary community resources.   Discussion with other health care providers: No discussion with other health care providers necessary.

## 2024-01-03 RX ORDER — CELECOXIB 100 MG/1
200 CAPSULE ORAL 2 TIMES DAILY
Qty: 180 CAPSULE | Refills: 1 | Status: SHIPPED | OUTPATIENT
Start: 2024-01-03 | End: 2024-02-20

## 2024-01-03 NOTE — TELEPHONE ENCOUNTER
----- Message from Dania Deshpande sent at 1/3/2024 10:14 AM CST -----  .Type:  RX Refill Request    Who Called ..Gayle Rhodes   Refill or New Rx:refill  RX Name and Strength:Celebrex  How is the patient currently taking it? (ex. 1XDay):daily  Is this a 30 day or 90 day RX:    Preferred Pharmacy with phone number:.  [id number] - [place] - [address] AT [facility] [address]  [address] [address]  Phone: [phone] Fax: [phone]    Local or Mail Order:local  Ordering Provider:  Would the patient rather a call back or a response via [facility]? Call back  Best Call Back Number:.[phone]  Additional Information: pt is completely out of the medicine

## 2024-01-03 NOTE — TELEPHONE ENCOUNTER
Care Due:                  Date            Visit Type   Department     Provider  --------------------------------------------------------------------------------                                EP -                              PRIMARY      [facility] INTERNAL  Last Visit: 12-      CARE ([facility])   [facility]       Lisandro Carolina                              EP -                              PRIMARY      [facility] INTERNAL  Next Visit: 06-      CARE ([facility])   [facility]                                                            Last  Test          Frequency    Reason                     Performed    Due Date  --------------------------------------------------------------------------------    Lipid Panel.  12 months..  atorvastatin.............  10-   10-    [employer] Embedded Care Due Messages. Reference number: [id number].   1/03/2024 10:52:28 AM CST

## 2024-01-03 NOTE — TELEPHONE ENCOUNTER
Patient stated that she had been taking the Celebrex 100 mg two capsules twice daily for a total of 400 mg daily that [facility] she [facility] her femur and she needs a refill now because she is out of it. She also wants Dr. Carolina to know that she has been doing well in PT and has now been allowed to place 50% weight on her leg. She also says she is taking her One A Day vitamin and drinking 2 protein shakes a day.

## 2024-01-09 RX ORDER — VERAPAMIL HYDROCHLORIDE 240 MG/1
CAPSULE, EXTENDED RELEASE ORAL
Qty: 180 CAPSULE | Refills: 3 | OUTPATIENT
Start: 2024-01-09

## 2024-01-09 NOTE — TELEPHONE ENCOUNTER
----- Message from Paris Ireland sent at 1/9/2024 11:11 AM CST -----  Contact: [facility]  ..Type:  RX Refill Request    Who Called: .Toya   Refill or New Rx:Refill   RX Name and Strength:verapamiL (VERELAN) 240 MG C24P  How is the patient currently taking it? (ex. 1XDay):2xday   Is this a 30 day or 90 day RX:   Preferred Pharmacy with phone number:.  [id number] - [place] - [address] AT [facility] [address]  [address] [address]  Phone: [phone] Fax: [phone]  Local or Mail Order:local   Ordering Provider:Caity   Would the patient rather a call back or a response via [facility]? Call back   Best Call Back Number:.[phone] (home)   Additional Information:

## 2024-01-10 ENCOUNTER — DOCUMENT SCAN (OUTPATIENT)
Dept: HOME HEALTH SERVICES | Facility: HOSPITAL | Age: 64
End: 2024-01-10
Payer: COMMERCIAL

## 2024-01-10 RX ORDER — VERAPAMIL HYDROCHLORIDE 240 MG/1
CAPSULE, EXTENDED RELEASE ORAL
Qty: 180 CAPSULE | Refills: 3 | Status: SHIPPED | OUTPATIENT
Start: 2024-01-10

## 2024-02-20 RX ORDER — CELECOXIB 100 MG/1
CAPSULE ORAL
Qty: 180 CAPSULE | Refills: 1 | Status: SHIPPED | OUTPATIENT
Start: 2024-02-20

## 2024-02-20 NOTE — TELEPHONE ENCOUNTER
Care Due:                  Date            Visit Type   Department     Provider  --------------------------------------------------------------------------------                                EP -                              PRIMARY      [facility] INTERNAL  Last Visit: 12-      CARE ([facility])   [facility]       Lisandro Carolina                              EP -                              PRIMARY      [facility] INTERNAL  Next Visit: 06-      CARE ([facility])   [facility]                                                            Last  Test          Frequency    Reason                     Performed    Due Date  --------------------------------------------------------------------------------    Vitamin D...  12 months..  ergocalciferol...........  Not Found    Overdue    [employer] Embedded Care Due Messages. Reference number: [id number].   2/20/2024 8:00:51 AM CST

## 2024-06-07 ENCOUNTER — PATIENT OUTREACH (OUTPATIENT)
Dept: ADMINISTRATIVE | Facility: HOSPITAL | Age: 64
End: 2024-06-07
Payer: COMMERCIAL

## 2024-06-07 DIAGNOSIS — F17.200 TOBACCO DEPENDENCE: Primary | ICD-10-CM

## 2024-06-07 NOTE — PROGRESS NOTES
VBHM Score: 3     Cervical Cancer Screening  Colon Cancer Screening  Mammogram    Shingles/Zoster Vaccine                  Health Maintenance Topic(s) Outreach Outcomes & Actions Taken:    Cervical Cancer Screening - Outreach Outcomes & Actions Taken  : Overdue    Colorectal Cancer Screening - Outreach Outcomes & Actions Taken  : Overdue    Breast Cancer Screening - Outreach Outcomes & Actions Taken  : Overdue         Additional Notes:  Attempted to contact pt, no ans, lvm pt has upcoming PCP appt 7.2.24               Care Management, Digital Medicine, and/or Education Referrals    OPCM Risk Score: 85.5         Next Steps - Referral Actions: No ans, will send referral due to  pt meet criteria

## 2024-06-10 ENCOUNTER — PATIENT OUTREACH (OUTPATIENT)
Dept: ADMINISTRATIVE | Facility: OTHER | Age: 64
End: 2024-06-10
Payer: COMMERCIAL

## 2024-06-10 NOTE — PROGRESS NOTES
CHW - Case Closure      Referral addressed by JUAN A Landa LPN due to OPCM risk score.(Value base score) Case management with insurance attempted outreach.

## 2024-06-13 DIAGNOSIS — F41.9 ANXIETY: ICD-10-CM

## 2024-06-13 NOTE — TELEPHONE ENCOUNTER
Care Due:                  Date            Visit Type   Department     Provider  --------------------------------------------------------------------------------                                EP -                              PRIMARY      [facility] INTERNAL  Last Visit: 12-      CARE ([facility])   [facility]       Lisandro Carolnia                              EP -                              PRIMARY      [facility] INTERNAL  Next Visit: 07-      CARE ([facility])   [facility]                                                            Last  Test          Frequency    Reason                     Performed    Due Date  --------------------------------------------------------------------------------    Lipid Panel.  12 months..  atorvastatin.............  10-   10-    Vitamin D...  12 months..  ergocalciferol...........  Not Found    Overdue    [employer] Embedded Care Due Messages. Reference number: [id number].   6/13/2024 5:28:52 PM CDT

## 2024-06-14 NOTE — TELEPHONE ENCOUNTER
----- Message from Candie Cowart sent at 6/14/2024 10:13 AM CDT -----  Contact[facility]  Type:  RX Refill Request    Who Called: Toya  Refill or New Rx:refill  RX Name and Strength:ALPRAZolam (XANAX) 0.5 MG tablet   How is the patient currently taking it? (ex. 1XDay):as prescribed  Is this a 30 day or 90 day RX:30  Preferred Pharmacy with phone number:  [id number] - [place] - [address] AT [facility]  [address]  [place] [address]  Phone: [phone] Fax: [phone]  Local or Mail Order:local  Ordering Provider:Dr. Carolina  Would the patient rather a call back or a response via [facility]? call  Best Call Back Number:[phone]   Additional Information: Reports patient request prescription refill. Please give patient a call back to notify when sent.   Thank you,  GH

## 2024-06-15 RX ORDER — ALPRAZOLAM 0.5 MG/1
0.5 TABLET ORAL 2 TIMES DAILY
Qty: 60 TABLET | Refills: 5 | Status: SHIPPED | OUTPATIENT
Start: 2024-06-15

## 2024-06-30 DIAGNOSIS — K21.9 GASTROESOPHAGEAL REFLUX DISEASE: ICD-10-CM

## 2024-06-30 RX ORDER — OMEPRAZOLE 20 MG/1
20 CAPSULE, DELAYED RELEASE ORAL
Qty: 90 CAPSULE | Refills: 1 | Status: SHIPPED | OUTPATIENT
Start: 2024-06-30

## 2024-06-30 NOTE — TELEPHONE ENCOUNTER
No care due was identified.  [facility] Embedded Care Due Messages. Reference number: [id number].   6/30/2024 5:49:28 AM CDT

## 2024-06-30 NOTE — TELEPHONE ENCOUNTER
Refill Decision Note   Gayle Meagan  is requesting a refill authorization.  Brief Assessment and Rationale for Refill:  Approve     Medication Therapy Plan:         Comments:     Note composed:9:16 AM 06/30/2024

## 2024-07-15 ENCOUNTER — TELEPHONE (OUTPATIENT)
Dept: INTERNAL MEDICINE | Facility: CLINIC | Age: 64
End: 2024-07-15
Payer: COMMERCIAL

## 2024-07-17 ENCOUNTER — OFFICE VISIT (OUTPATIENT)
Dept: INTERNAL MEDICINE | Facility: CLINIC | Age: 64
End: 2024-07-17
Payer: COMMERCIAL

## 2024-07-17 DIAGNOSIS — F41.9 ANXIETY: ICD-10-CM

## 2024-07-17 DIAGNOSIS — K21.9 GASTROESOPHAGEAL REFLUX DISEASE WITHOUT ESOPHAGITIS: ICD-10-CM

## 2024-07-17 DIAGNOSIS — D62 ACUTE BLOOD LOSS ANEMIA: ICD-10-CM

## 2024-07-17 DIAGNOSIS — M81.0 AGE-RELATED OSTEOPOROSIS WITHOUT CURRENT PATHOLOGICAL FRACTURE: ICD-10-CM

## 2024-07-17 DIAGNOSIS — Z12.11 COLON CANCER SCREENING: ICD-10-CM

## 2024-07-17 DIAGNOSIS — I70.90 ATHEROSCLEROSIS: ICD-10-CM

## 2024-07-17 DIAGNOSIS — M15.9 PRIMARY OSTEOARTHRITIS INVOLVING MULTIPLE JOINTS: ICD-10-CM

## 2024-07-17 DIAGNOSIS — I10 ESSENTIAL HYPERTENSION: Primary | ICD-10-CM

## 2024-07-17 PROCEDURE — 99214 OFFICE O/P EST MOD 30 MIN: CPT | Mod: 95,,, | Performed by: PEDIATRICS

## 2024-07-17 PROCEDURE — 1160F RVW MEDS BY RX/DR IN RCRD: CPT | Mod: CPTII,95,, | Performed by: PEDIATRICS

## 2024-07-17 PROCEDURE — 1159F MED LIST DOCD IN RCRD: CPT | Mod: CPTII,95,, | Performed by: PEDIATRICS

## 2024-07-17 RX ORDER — ALPRAZOLAM 0.5 MG/1
0.5 TABLET ORAL 2 TIMES DAILY
Qty: 60 TABLET | Refills: 5 | Status: SHIPPED | OUTPATIENT
Start: 2024-07-17

## 2024-07-17 RX ORDER — ATORVASTATIN CALCIUM 40 MG/1
40 TABLET, FILM COATED ORAL DAILY
Qty: 90 TABLET | Refills: 3 | Status: SHIPPED | OUTPATIENT
Start: 2024-07-17

## 2024-07-17 RX ORDER — ALENDRONATE SODIUM 70 MG/1
TABLET ORAL
Qty: 12 TABLET | Refills: 3 | Status: SHIPPED | OUTPATIENT
Start: 2024-07-17

## 2024-07-17 NOTE — PROGRESS NOTES
TELEMEDICINE VIRTUAL VISIT    Subjective:       Patient ID: Gayle Rhodes is a 64 y.o. female.    Chief Complaint: Follow-up    Televisit for follow up    Gayle Rhodes is a 63 y.o. female who presents to the clinic for annual. 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 Ortho has her on light duty through November, so if deployment occurs, she will need to d/w HR. [place].   2)HTN: HTN: B/P good, no HTNive symptoms. normal home monitoring, readings  3)Nonobstructive atherosclerosis of coronary artery: followed by cardiology, no CP or SOB. Taking statin.  4)GERD: quiet on PPI, mild chronic swalloing problems.  5)Osteoporosis/osteoarthritis: needs follow up with rheumatology, on vitamin D supplementation and Forteo. Has had trouble with supply, wants to restart fosamax, she reports her dexascan worsened because she was not consistent. Taking D weekly.  6)Tobacco dependence: still smoking       Follow-up  Pertinent negatives include no arthralgias, chest pain, headaches, joint swelling, neck pain, vomiting or weakness.     Review of Systems   Constitutional:  Negative for activity change and unexpected weight change.   HENT:  Positive for trouble swallowing. Negative for hearing loss and rhinorrhea.    Eyes:  Negative for discharge and visual disturbance.   Respiratory:  Negative for chest tightness and wheezing.    Cardiovascular:  Negative for chest pain and palpitations.   Gastrointestinal:  Negative for blood in stool, constipation, diarrhea and vomiting.   Endocrine: Negative for polydipsia and polyuria.   Genitourinary:  Negative for difficulty urinating, dysuria, hematuria and menstrual problem.   Musculoskeletal:  Negative for  arthralgias, joint swelling and neck pain.   Neurological:  Negative for weakness and headaches.   Psychiatric/Behavioral:  Negative for confusion and dysphoric mood.        Objective:      CONSTITUTIONAL: No apparent distress. Does not appear acutely ill or septic. Appears adequately hydrated.  PULM: Breathing unlabored.  PSYCHIATRIC: Alert and conversant and grossly oriented. Mood is grossly neutral. Affect appropriate. Judgment and insight grossly intact.      Assessment:       1. Essential hypertension    2. Age-related osteoporosis without current pathological fracture    3. Primary osteoarthritis involving multiple joints    4. Gastroesophageal reflux disease without esophagitis    5. Acute blood loss anemia    6. Atherosclerosis    7. Colon cancer screening    8. Anxiety        Plan:       Essential hypertension  -     Lipid Panel; Future; Expected date: 07/17/2024  -     Comprehensive Metabolic Panel; Future; Expected date: 07/17/2024    Age-related osteoporosis without current pathological fracture  -     Vitamin D; Future; Expected date: 07/17/2024  -     alendronate (FOSAMAX) 70 MG tablet; TAKE 1 TABLET BY MOUTH 1 TIME WEEKLY AS DIRECTED  Dispense: 12 tablet; Refill: 3    Primary osteoarthritis involving multiple joints    Gastroesophageal reflux disease without esophagitis    Acute blood loss anemia  -     CBC Auto Differential; Future; Expected date: 07/17/2024    Atherosclerosis  -     atorvastatin (LIPITOR) 40 MG tablet; Take 1 tablet (40 mg total) by mouth once daily.  Dispense: 90 tablet; Refill: 3    Colon cancer screening  -     Ambulatory referral/consult to Endo Procedure ; Future; Expected date: 07/18/2024    Anxiety  -     ALPRAZolam (XANAX) 0.5 MG tablet; Take 1 tablet (0.5 mg total) by mouth 2 (two) times daily. as needed for anxiety.  Dispense: 60 tablet; Refill: 5       Overall she is stable and slowly recovering from Knee fracture. Maintain  meds. F/U 6 months with labs. Total  "chart time 25 minutes  Documentation entered by me for this encounter may have been done in part using speech-recognition technology. Although I have made an effort to ensure accuracy, "sound like" errors may exist and should be interpreted in context. -MEHNAZ Carolina MD    Visit Details: This visit was a telemedicine virtual visit with synchronous audio and video. Belia reported that her location at the time of this visit was in the [facility]. Belia had the choice to come into office to receive these medical services. Belia chose and consented to receive these medical services by telemedicine.  "

## 2024-07-21 DIAGNOSIS — E55.9 VITAMIN D DEFICIENCY: ICD-10-CM

## 2024-07-21 NOTE — TELEPHONE ENCOUNTER
Care Due:                  Date            Visit Type   Department     Provider  --------------------------------------------------------------------------------                                ESTABLISHED                              PATIENT -    [facility] INTERNAL  Last Visit: 07-      [facility]       Lisandro Carolina  Next Visit: None Scheduled  None         None Found                                                            Last  Test          Frequency    Reason                     Performed    Due Date  --------------------------------------------------------------------------------    Mg Level....  12 months..  alendronate..............  Not Found    Overdue    Phosphate...  12 months..  alendronate..............  Not Found    Overdue    [employer] Embedded Care Due Messages. Reference number: [id number].   7/21/2024 1:03:39 PM CDT

## 2024-07-22 RX ORDER — ERGOCALCIFEROL 1.25 MG/1
50000 CAPSULE ORAL
Qty: 12 CAPSULE | Refills: 3 | Status: SHIPPED | OUTPATIENT
Start: 2024-07-22

## 2024-07-30 DIAGNOSIS — I70.90 ATHEROSCLEROSIS: ICD-10-CM

## 2024-07-30 RX ORDER — ASPIRIN 81 MG/1
81 TABLET ORAL
Qty: 90 TABLET | Refills: 3 | Status: SHIPPED | OUTPATIENT
Start: 2024-07-30

## 2024-09-20 NOTE — TELEPHONE ENCOUNTER
Care Due:                  Date            Visit Type   Department     Provider  --------------------------------------------------------------------------------                                ESTABLISHED                              PATIENT -    [facility] INTERNAL  Last Visit: 07-      [facility]       Lisandro Ebenezer Amadordge  Next Visit: None Scheduled  None         None Found                                                            Last  Test          Frequency    Reason                     Performed    Due Date  --------------------------------------------------------------------------------    CBC.........  12 months..  celecoxib................  12- 11-    CMP.........  12 months..  alendronate,               12- 11-                             atorvastatin, celecoxib,                             ergocalciferol...........    Lipid Panel.  12 months..  atorvastatin.............  10-   10-    Mg Level....  12 months..  alendronate..............  Not Found    Overdue    Phosphate...  12 months..  alendronate..............  Not Found    Overdue    Vitamin D...  12 months..  ergocalciferol...........  Not Found    Overdue    [employer] Embedded Care Due Messages. Reference number: [id number].   9/20/2024 5:56:05 PM CDT

## 2024-09-23 RX ORDER — CELECOXIB 100 MG/1
CAPSULE ORAL
Qty: 180 CAPSULE | Refills: 1 | Status: SHIPPED | OUTPATIENT
Start: 2024-09-23

## 2024-12-02 ENCOUNTER — PATIENT OUTREACH (OUTPATIENT)
Dept: ADMINISTRATIVE | Facility: HOSPITAL | Age: 64
End: 2024-12-02
Payer: COMMERCIAL

## 2024-12-02 NOTE — PROGRESS NOTES
VBC OUTREACH: per chart pt is overdue for the following:  attempted to contact pt no ans, lvm, sent portal message.

## 2024-12-18 DIAGNOSIS — F41.9 ANXIETY: ICD-10-CM

## 2024-12-18 RX ORDER — ESCITALOPRAM OXALATE 20 MG/1
20 TABLET ORAL DAILY
Qty: 90 TABLET | Refills: 1 | Status: SHIPPED | OUTPATIENT
Start: 2024-12-18

## 2024-12-18 NOTE — TELEPHONE ENCOUNTER
Care Due:                  Date            Visit Type   Department     Provider  --------------------------------------------------------------------------------                                ESTABLISHED                              PATIENT -    [facility] INTERNAL  Last Visit: 07-      [facility]       Lisandro Carolina  Next Visit: None Scheduled  None         None Found                                                            Last  Test          Frequency    Reason                     Performed    Due Date  --------------------------------------------------------------------------------    CBC.........  12 months..  celecoxib................  12- 11-    CMP.........  12 months..  alendronate,               12- 11-                             atorvastatin, celecoxib,                             ergocalciferol...........    Lipid Panel.  12 months..  atorvastatin.............  10-   10-    Mg Level....  12 months..  alendronate..............  Not Found    Overdue    Phosphate...  12 months..  alendronate..............  Not Found    Overdue    Vitamin D...  12 months..  ergocalciferol...........  Not Found    Overdue    [employer] Embedded Care Due Messages. Reference number: [id number].   12/18/2024 4:31:33 PM CST

## 2024-12-19 NOTE — TELEPHONE ENCOUNTER
Refill Decision Note   Gayle Meagan  is requesting a refill authorization.  Brief Assessment and Rationale for Refill:  Approve     Medication Therapy Plan:  FLOS      Comments:     Note composed:7:56 PM 12/18/2024

## 2024-12-24 DIAGNOSIS — K21.9 GASTROESOPHAGEAL REFLUX DISEASE: ICD-10-CM

## 2024-12-24 RX ORDER — OMEPRAZOLE 20 MG/1
20 CAPSULE, DELAYED RELEASE ORAL
Qty: 90 CAPSULE | Refills: 1 | Status: SHIPPED | OUTPATIENT
Start: 2024-12-24

## 2024-12-24 NOTE — TELEPHONE ENCOUNTER
No care due was identified.  [facility] Embedded Care Due Messages. Reference number: [id number].   12/24/2024 3:29:13 PM CST

## 2024-12-25 NOTE — TELEPHONE ENCOUNTER
Refill Authorization Note     Refill Decision Note   Gayle Rhodes  is requesting a refill authorization.  Brief Assessment and Rationale for Refill:  Approve     Medication Therapy Plan:       Medication Reconciliation Completed: No   Comments:     No Care Gaps recommended.     Note composed:6:12 PM 12/24/2024

## 2025-02-07 ENCOUNTER — PATIENT OUTREACH (OUTPATIENT)
Dept: ADMINISTRATIVE | Facility: HOSPITAL | Age: 65
End: 2025-02-07
Payer: COMMERCIAL

## 2025-02-07 NOTE — PROGRESS NOTES
HTN.ANNUAL REPORT     LVM        Odomzo Counseling- I discussed with the patient the risks of Odomzo including but not limited to nausea, vomiting, diarrhea, constipation, weight loss, changes in the sense of taste, decreased appetite, muscle spasms, and hair loss.  The patient verbalized understanding of the proper use and possible adverse effects of Odomzo.  All of the patient's questions and concerns were addressed.

## 2025-02-22 NOTE — TELEPHONE ENCOUNTER
Care Due:                  Date            Visit Type   Department     Provider  --------------------------------------------------------------------------------                                ESTABLISHED                              PATIENT -    [facility] INTERNAL  Last Visit: 07-      [facility]       Lisandro Carolina  Next Visit: None Scheduled  None         None Found                                                            Last  Test          Frequency    Reason                     Performed    Due Date  --------------------------------------------------------------------------------    CBC.........  12 months..  celecoxib................  12- 11-    CMP.........  12 months..  alendronate,               12- 11-                             atorvastatin, celecoxib,                             ergocalciferol...........    Lipid Panel.  12 months..  atorvastatin.............  10-   10-    Mg Level....  12 months..  alendronate..............  Not Found    Overdue    Phosphate...  12 months..  alendronate..............  Not Found    Overdue    Vitamin D...  12 months..  ergocalciferol...........  Not Found    Overdue    [employer] Embedded Care Due Messages. Reference number: [id number].   2/22/2025 3:58:41 PM CST

## 2025-02-24 RX ORDER — CELECOXIB 100 MG/1
CAPSULE ORAL
Qty: 180 CAPSULE | Refills: 1 | Status: SHIPPED | OUTPATIENT
Start: 2025-02-24

## 2025-03-06 ENCOUNTER — PATIENT MESSAGE (OUTPATIENT)
Dept: ADMINISTRATIVE | Facility: HOSPITAL | Age: 65
End: 2025-03-06
Payer: COMMERCIAL

## 2025-03-19 ENCOUNTER — OFFICE VISIT (OUTPATIENT)
Dept: INTERNAL MEDICINE | Facility: CLINIC | Age: 65
End: 2025-03-19
Payer: COMMERCIAL

## 2025-03-19 VITALS — DIASTOLIC BLOOD PRESSURE: 80 MMHG | SYSTOLIC BLOOD PRESSURE: 120 MMHG

## 2025-03-19 DIAGNOSIS — M81.0 AGE-RELATED OSTEOPOROSIS WITHOUT CURRENT PATHOLOGICAL FRACTURE: ICD-10-CM

## 2025-03-19 DIAGNOSIS — E55.9 VITAMIN D DEFICIENCY: ICD-10-CM

## 2025-03-19 DIAGNOSIS — I25.10 NONOBSTRUCTIVE ATHEROSCLEROSIS OF CORONARY ARTERY: ICD-10-CM

## 2025-03-19 DIAGNOSIS — M15.0 PRIMARY OSTEOARTHRITIS INVOLVING MULTIPLE JOINTS: ICD-10-CM

## 2025-03-19 DIAGNOSIS — K21.9 GASTROESOPHAGEAL REFLUX DISEASE WITHOUT ESOPHAGITIS: ICD-10-CM

## 2025-03-19 DIAGNOSIS — F17.200 TOBACCO DEPENDENCE: ICD-10-CM

## 2025-03-19 DIAGNOSIS — I10 ESSENTIAL HYPERTENSION: ICD-10-CM

## 2025-03-19 DIAGNOSIS — Z00.00 WELL ADULT EXAM: Primary | ICD-10-CM

## 2025-03-19 DIAGNOSIS — I70.90 ATHEROSCLEROSIS: ICD-10-CM

## 2025-03-19 DIAGNOSIS — F41.9 ANXIETY: ICD-10-CM

## 2025-03-19 DIAGNOSIS — K21.9 GASTROESOPHAGEAL REFLUX DISEASE: ICD-10-CM

## 2025-03-19 PROCEDURE — 1159F MED LIST DOCD IN RCRD: CPT | Mod: CPTII,95,, | Performed by: PEDIATRICS

## 2025-03-19 PROCEDURE — 99397 PER PM REEVAL EST PAT 65+ YR: CPT | Mod: 95,,, | Performed by: PEDIATRICS

## 2025-03-19 PROCEDURE — 1160F RVW MEDS BY RX/DR IN RCRD: CPT | Mod: CPTII,95,, | Performed by: PEDIATRICS

## 2025-03-19 PROCEDURE — 3074F SYST BP LT 130 MM HG: CPT | Mod: CPTII,95,, | Performed by: PEDIATRICS

## 2025-03-19 PROCEDURE — 3079F DIAST BP 80-89 MM HG: CPT | Mod: CPTII,95,, | Performed by: PEDIATRICS

## 2025-03-19 RX ORDER — ESCITALOPRAM OXALATE 20 MG/1
20 TABLET ORAL DAILY
Qty: 90 TABLET | Refills: 3 | Status: SHIPPED | OUTPATIENT
Start: 2025-03-19

## 2025-03-19 RX ORDER — ALENDRONATE SODIUM 70 MG/1
TABLET ORAL
Qty: 12 TABLET | Refills: 3 | Status: SHIPPED | OUTPATIENT
Start: 2025-03-19

## 2025-03-19 RX ORDER — OMEPRAZOLE 20 MG/1
20 CAPSULE, DELAYED RELEASE ORAL DAILY
Qty: 90 CAPSULE | Refills: 3 | Status: SHIPPED | OUTPATIENT
Start: 2025-03-19

## 2025-03-19 RX ORDER — ALPRAZOLAM 0.5 MG/1
0.5 TABLET ORAL 2 TIMES DAILY
Qty: 60 TABLET | Refills: 5 | Status: SHIPPED | OUTPATIENT
Start: 2025-03-19

## 2025-03-19 RX ORDER — OMEPRAZOLE 20 MG/1
20 CAPSULE, DELAYED RELEASE ORAL DAILY
Qty: 90 CAPSULE | Refills: 3 | Status: SHIPPED | OUTPATIENT
Start: 2025-03-19 | End: 2025-03-19 | Stop reason: SDUPTHER

## 2025-03-19 RX ORDER — ERGOCALCIFEROL 1.25 MG/1
50000 CAPSULE ORAL
Qty: 12 CAPSULE | Refills: 3 | Status: SHIPPED | OUTPATIENT
Start: 2025-03-19

## 2025-03-19 RX ORDER — VERAPAMIL HYDROCHLORIDE 240 MG/1
CAPSULE, DELAYED RELEASE ORAL
Qty: 180 CAPSULE | Refills: 3 | Status: SHIPPED | OUTPATIENT
Start: 2025-03-19

## 2025-03-19 RX ORDER — ATORVASTATIN CALCIUM 40 MG/1
40 TABLET, FILM COATED ORAL DAILY
Qty: 90 TABLET | Refills: 3 | Status: SHIPPED | OUTPATIENT
Start: 2025-03-19

## 2025-03-19 NOTE — LETTER
March 19, 2025      The [address]  [address] [facility]  [place] [address]  Phone: [phone]  Fax: [phone]       Patient: Gayle Rhodes   YOB: 1960  Date of Visit: 03/19/2025    To Whom It May Concern:    Eduin Rhodes  was at [facility] on 03/19/2025. The patient may return to work/school on 03/20/2025 with no restrictions. If you have any questions or concerns, or if I can be of further assistance, please do not hesitate to contact me.    Sincerely,    Lor Mchugh LPN

## 2025-03-19 NOTE — LETTER
March 19, 2025      The [address]  [address] [facility]  [place] [address]  Phone: [phone]  Fax: [phone]       Patient: Gayle Rhodes   YOB: 1960  Date of Visit: 03/19/2025    To Whom It May Concern:    Eduin Rhodes  was seen by Dr. Carolina on 03/19/2025. The patient may return to work/school on 03/20/2025 with no restrictions. If you have any questions or concerns, or if I can be of further assistance, please do not hesitate to contact me.    Sincerely,    Lor Mchugh LPN

## 2025-03-19 NOTE — LETTER
March 19, 2025    Gayle Rhodes  [address] [place] [id number]             The [address]  [address] [address]  [place] [address]  Phone: [phone]  Fax: [phone] Dear {MR/MRS/MS/:47348} Meagan:    ***      If you have any questions or concerns, please don't hesitate to call.    Sincerely,        Lisandro Carolina MD

## 2025-03-19 NOTE — PROGRESS NOTES
TELEMEDICINE VIRTUAL VISIT    Subjective:       Patient ID: Gayle Rhodes is a 65 y.o. female.    Chief Complaint: No chief complaint on file.    Gayle Rhodes is a 65 y.o. female who presents to the clinic for annual. She is still stationed with [employer] making in person visits here difficult.         1)Anxiety: on xanax and lexapro, [place]  website reviewed. Overall reports stable but persistent social and work stressors, step daughter living with them, work travel.    2)HTN: HTN: B/P good, no HTNive symptoms. normal home monitoring, readings compliant with meds  3)Nonobstructive atherosclerosis of coronary artery: followed by cardiology but overdue f/u due to work, no CP or SOB. Taking statin.  4)GERD: quiet on PPI, mild chronic swallowing problems.  5)Osteoporosis/osteoarthritis: needs follow up with rheumatology, on vitamin D supplementation and Forteo. On fosamax. Taking D weekly.  6)Tobacco dependence: still smoking         Review of Systems   Constitutional:  Positive for activity change. Negative for unexpected weight change.   HENT:  Positive for trouble swallowing. Negative for hearing loss and rhinorrhea.    Eyes:  Positive for visual disturbance. Negative for discharge.   Respiratory:  Negative for chest tightness and wheezing.    Cardiovascular:  Negative for chest pain and palpitations.   Gastrointestinal:  Negative for abdominal pain, constipation, diarrhea and vomiting.   Endocrine: Negative for cold intolerance, heat intolerance, polydipsia, polyphagia and polyuria.   Genitourinary:  Negative for difficulty urinating, dysuria, hematuria and menstrual problem.   Musculoskeletal:  Positive for arthralgias. Negative for joint swelling and neck pain.   Neurological:  Negative for headaches.   Psychiatric/Behavioral:  Negative for dysphoric mood, self-injury, sleep disturbance and suicidal ideas. The patient is nervous/anxious.        Objective:      CONSTITUTIONAL: No apparent distress. Does not  appear acutely ill or septic. Appears adequately hydrated.  PULM: Breathing unlabored.  PSYCHIATRIC: Alert and conversant and grossly oriented. Mood is grossly neutral. Affect appropriate. Judgment and insight grossly intact.      Assessment:       1. Well adult exam    2. Essential hypertension    3. Gastroesophageal reflux disease without esophagitis    4. Nonobstructive atherosclerosis of coronary artery    5. Anxiety    6. Tobacco dependence    7. Primary osteoarthritis involving multiple joints    8. Age-related osteoporosis without current pathological fracture    9. Atherosclerosis    10. Vitamin D deficiency    11. Gastroesophageal reflux disease        Plan:       Well adult exam    Essential hypertension    Gastroesophageal reflux disease without esophagitis    Nonobstructive atherosclerosis of coronary artery    Anxiety  -     ALPRAZolam (XANAX) 0.5 MG tablet; Take 1 tablet (0.5 mg total) by mouth 2 (two) times daily. as needed for anxiety.  Dispense: 60 tablet; Refill: 5  -     EScitalopram oxalate (LEXAPRO) 20 MG tablet; Take 1 tablet (20 mg total) by mouth once daily.  Dispense: 90 tablet; Refill: 3    Tobacco dependence    Primary osteoarthritis involving multiple joints    Age-related osteoporosis without current pathological fracture  -     alendronate (FOSAMAX) 70 MG tablet; TAKE 1 TABLET BY MOUTH 1 TIME WEEKLY AS DIRECTED  Dispense: 12 tablet; Refill: 3    Atherosclerosis  -     atorvastatin (LIPITOR) 40 MG tablet; Take 1 tablet (40 mg total) by mouth once daily.  Dispense: 90 tablet; Refill: 3    Vitamin D deficiency  -     ergocalciferol (ERGOCALCIFEROL) 50,000 unit Cap; Take 1 capsule (50,000 Units total) by mouth every 7 days.  Dispense: 12 capsule; Refill: 3    Gastroesophageal reflux disease  -     Discontinue: omeprazole (PRILOSEC) 20 MG capsule; Take 1 capsule (20 mg total) by mouth once daily.  Dispense: 90 capsule; Refill: 3  -     omeprazole (PRILOSEC) 20 MG capsule; Take 1 capsule (20  "mg total) by mouth once daily.  Dispense: 90 capsule; Refill: 3    Other orders  -     verapamiL (VERELAN) 240 MG C24P; TAKE 1 CAPSULE(240 MG) BY MOUTH TWICE DAILY  Dispense: 180 capsule; Refill: 3       When she can, get her labs. When work allows, she will come in for inperson appt. HMI reviewed with patient. Will need catch up when work permits. Smoking cessation advised. Total chart time 15 minutes.  Documentation entered by me for this encounter may have been done in part using speech-recognition technology. Although I have made an effort to ensure accuracy, "sound like" errors may exist and should be interpreted in context. -MEHNAZ Carolina MD    Visit Details: This visit was a telemedicine virtual visit with synchronous audio and video. Belia reported that her location at the time of this visit was in the [facility]. Belia had the choice to come into office to receive these medical services. Belia chose and consented to receive these medical services by telemedicine.  "

## 2025-03-19 NOTE — LETTER
March 19, 2025      The [address]  [address] [facility]  [place] [address]  Phone: [phone]  Fax: [phone]       Patient: Gayle Rhodes   YOB: 1960  Date of Visit: 03/19/2025    To Whom It May Concern:    Eduin Rhodes  was at [facility] on 03/19/2025. The patient may return to work/school on *** {With/no:49572} restrictions. If you have any questions or concerns, or if I can be of further assistance, please do not hesitate to contact me.    Sincerely,    Lor Mchugh LPN

## 2025-04-01 DIAGNOSIS — I70.90 ATHEROSCLEROSIS: ICD-10-CM

## 2025-04-01 NOTE — TELEPHONE ENCOUNTER
No care due was identified.  [facility] Embedded Care Due Messages. Reference number: [id number].   4/01/2025 12:23:44 PM CDT

## 2025-04-01 NOTE — TELEPHONE ENCOUNTER
Refill Routing Note   Medication(s) are not appropriate for processing by [facility] for the following reason(s):        Required labs outdated  Outside of protocol    [facility] action(s):  Defer  Route               Appointments  past 12m or future 3m with PCP    Date Provider   Last Visit   3/19/2025 Lisandro Carolina MD   Next Visit   Visit date not found Lisandro Carolina MD   ED visits in past 90 days: 0        Note composed:6:26 PM 04/01/2025

## 2025-04-02 RX ORDER — ATORVASTATIN CALCIUM 40 MG/1
40 TABLET, FILM COATED ORAL
Qty: 90 TABLET | Refills: 3 | Status: SHIPPED | OUTPATIENT
Start: 2025-04-02

## 2025-04-02 RX ORDER — CELECOXIB 100 MG/1
CAPSULE ORAL
Qty: 180 CAPSULE | Refills: 3 | Status: SHIPPED | OUTPATIENT
Start: 2025-04-02

## 2025-04-15 ENCOUNTER — PATIENT OUTREACH (OUTPATIENT)
Dept: ADMINISTRATIVE | Facility: HOSPITAL | Age: 65
End: 2025-04-15
Payer: COMMERCIAL

## 2025-04-29 ENCOUNTER — PATIENT OUTREACH (OUTPATIENT)
Dept: ADMINISTRATIVE | Facility: HOSPITAL | Age: 65
End: 2025-04-29
Payer: COMMERCIAL

## 2025-05-19 ENCOUNTER — OFFICE VISIT (OUTPATIENT)
Dept: ORTHOPEDIC SURGERY | Age: 65
End: 2025-05-19
Payer: COMMERCIAL

## 2025-05-19 ENCOUNTER — TELEPHONE (OUTPATIENT)
Dept: ORTHOPEDIC SURGERY | Age: 65
End: 2025-05-19

## 2025-05-19 DIAGNOSIS — S92.511A CLOSED DISPLACED FRACTURE OF PROXIMAL PHALANX OF LESSER TOE OF RIGHT FOOT, INITIAL ENCOUNTER: Primary | ICD-10-CM

## 2025-05-19 PROCEDURE — M5017 MISC EVENUP: HCPCS | Performed by: ORTHOPAEDIC SURGERY

## 2025-05-19 PROCEDURE — L4361 PNEUMA/VAC WALK BOOT PRE OTS: HCPCS | Performed by: ORTHOPAEDIC SURGERY

## 2025-05-19 PROCEDURE — 1123F ACP DISCUSS/DSCN MKR DOCD: CPT | Performed by: ORTHOPAEDIC SURGERY

## 2025-05-19 PROCEDURE — 99203 OFFICE O/P NEW LOW 30 MIN: CPT | Performed by: ORTHOPAEDIC SURGERY

## 2025-05-19 NOTE — PROGRESS NOTES
Patient was fitted and instructed on an Even Up for the left foot.  The patient was prescribed a walker boot for the patient's right foot. The patient wears a size 6.5 shoe and I fitted them with a S size boot. The patient was fitted and instructed on the use of prescribed walker boot by a Registered Orthopedic Technician. I explained how to fit themselves and that the plastic flexible piece should always be on the front of the boot and secured by the Velcro straps on top. The air bladder in the boot was adjusted according to proper fit and comfort. The patient walked a short distance and acknowledged satisfaction with current fit. I also explained that they need a heel lift or a higher heeled shoe for the uninvolved LE to help normalize gait and avoid excessive low back stress/strain due to leg length inequality created from walker boot.Patient read and signed documenting they understand and agree to [facility]'s current DME return policy.

## 2025-05-19 NOTE — TELEPHONE ENCOUNTER
When called pt. For [facility] referral and to verify the same pt. As [facility] had not put address and phone no. On the referral.     As per Pt., she Wants f/up with Dr. aLnge after 2 weeks

## 2025-05-19 NOTE — PROGRESS NOTES
Name: Lori Cobb  YOB: 1960  Gender: female  MRN: [id number]     CC: Right foot injury    HPI:   05/12/2025: Right foot injury  05/16/2025: Urgent care  05/19/2025: Initial visit: Right foot injury:    ROS/Meds/PSH/PMH/FH/SH: Only reviewed pertinent/relevant information      Tobacco:  has no history on file for tobacco use.     Reviewed Test/Records/Documents:   03/19/2025: [facility]/subsidiary/affiliates: Well adult exam: HTN: GERD: Nonobstructive atherosclerosis of coronary artery: Anxiety: Tobacco dependence: Osteoarthritis multiple joints: Age-related osteoporosis: Atherosclerosis: Vitamin D deficiency    05/16/2025: [facility] care: Right foot pain: Right toe pain: 1 week duration: Diagnosed contusion: Right foot pain:  05/16/2025: [facility] urgent care: Right foot radiographs: Radiology impression:  There is an oblique mildly displaced fracture of the mid to distal proximal 4th phalanx  There is no periarticular erosion or significant arthrosis  The soft tissues are unremarkable  [facility] Urgent care radiographs reviewed: My review: Displaced intra-articular PIP joint 4th toe proximal phalanx fracture: More probable distal 3rd toe oblique proximal phalanx fracture    Physical Examination:  Patient appears to be alert and oriented with acceptable appearance.  No obvious distress or SOB  CV: Right foot = acceptable appearing vascular flow, color, capillary refill   Neuro: Right foot = appears to have intact light touch sensation   Skin: Right foot = 2-3 PIP thickening; 4th toe soft tissue swelling  MS: Standing: Plantigrade: 4th curly toe but no overlapping rotary or angular deformity: Gait protected  Right = mainly 4th toe proximal phalanx PIP area pain  Right = mild 3rd toe PIP pain    XR: Right: Standing AP lateral oblique foot taken on return  XR Impression:  As above       Assessment:    Right foot injury  Right displaced 4th toe intra-articular PIP joint proximal phalanx fracture  Right

## 2025-05-20 ENCOUNTER — TELEPHONE (OUTPATIENT)
Dept: ORTHOPEDIC SURGERY | Age: 65
End: 2025-05-20

## 2025-05-20 NOTE — TELEPHONE ENCOUNTER
Called and spoke to pt , pt called requesting an out of work note until 05/23/2025. Pt will  note at [facility]

## 2025-05-20 NOTE — TELEPHONE ENCOUNTER
Retuning pt call. Pt was instructed to take tape off her toes for few hours and check her circulation as well as clean between her toe. PT voiced understood.

## 2025-05-21 DIAGNOSIS — Z78.0 MENOPAUSE: ICD-10-CM

## 2025-05-22 ENCOUNTER — OFFICE VISIT (OUTPATIENT)
Dept: ORTHOPEDIC SURGERY | Age: 65
End: 2025-05-22
Payer: COMMERCIAL

## 2025-05-22 ENCOUNTER — TELEPHONE (OUTPATIENT)
Dept: ORTHOPEDIC SURGERY | Age: 65
End: 2025-05-22

## 2025-05-22 DIAGNOSIS — M79.2 FOOT NEURALGIA: ICD-10-CM

## 2025-05-22 DIAGNOSIS — S92.511G CLOSED DISPLACED FRACTURE OF PROXIMAL PHALANX OF LESSER TOE OF RIGHT FOOT WITH DELAYED HEALING, SUBSEQUENT ENCOUNTER: Primary | ICD-10-CM

## 2025-05-22 PROCEDURE — 99213 OFFICE O/P EST LOW 20 MIN: CPT | Performed by: ORTHOPAEDIC SURGERY

## 2025-05-22 PROCEDURE — 1123F ACP DISCUSS/DSCN MKR DOCD: CPT | Performed by: ORTHOPAEDIC SURGERY

## 2025-05-22 RX ORDER — ASPIRIN 81 MG/1
81 TABLET, COATED ORAL DAILY
COMMUNITY

## 2025-05-22 RX ORDER — TRAMADOL HYDROCHLORIDE 50 MG/1
TABLET ORAL
COMMUNITY
Start: 2025-05-16 | End: 2025-05-22 | Stop reason: ALTCHOICE

## 2025-05-22 RX ORDER — CELECOXIB 100 MG/1
CAPSULE ORAL
COMMUNITY
Start: 2025-04-02

## 2025-05-22 RX ORDER — ALENDRONATE SODIUM 70 MG/1
TABLET ORAL
COMMUNITY
Start: 2025-03-19

## 2025-05-22 RX ORDER — VERAPAMIL HYDROCHLORIDE 240 MG/1
CAPSULE, DELAYED RELEASE ORAL
COMMUNITY
Start: 2025-03-19

## 2025-05-22 RX ORDER — ONDANSETRON 4 MG/1
4 TABLET, ORALLY DISINTEGRATING ORAL EVERY 6 HOURS PRN
COMMUNITY
Start: 2025-02-02

## 2025-05-22 RX ORDER — ESCITALOPRAM OXALATE 20 MG/1
20 TABLET ORAL DAILY
COMMUNITY
Start: 2025-03-19

## 2025-05-22 RX ORDER — ALPRAZOLAM 0.5 MG
TABLET ORAL
COMMUNITY
Start: 2025-03-19

## 2025-05-22 RX ORDER — GABAPENTIN 100 MG/1
100 CAPSULE ORAL 3 TIMES DAILY
Qty: 90 CAPSULE | Refills: 0 | Status: SHIPPED | OUTPATIENT
Start: 2025-05-22 | End: 2025-05-22

## 2025-05-22 RX ORDER — AMLODIPINE BESYLATE 10 MG/1
TABLET ORAL
COMMUNITY

## 2025-05-22 RX ORDER — ATORVASTATIN CALCIUM 40 MG/1
40 TABLET, FILM COATED ORAL
COMMUNITY
Start: 2025-04-02

## 2025-05-22 RX ORDER — ERGOCALCIFEROL 1.25 MG/1
50000 CAPSULE, LIQUID FILLED ORAL
COMMUNITY
Start: 2025-03-19

## 2025-05-22 RX ORDER — OMEPRAZOLE 20 MG/1
20 CAPSULE, DELAYED RELEASE ORAL DAILY
COMMUNITY
Start: 2025-03-19

## 2025-05-22 RX ORDER — PSEUDOEPHED/ACETAMINOPH/DIPHEN 30MG-500MG
TABLET ORAL
COMMUNITY
Start: 2025-05-16

## 2025-05-22 RX ORDER — GABAPENTIN 100 MG/1
100 CAPSULE ORAL 3 TIMES DAILY
Qty: 90 CAPSULE | Refills: 0 | Status: SHIPPED | OUTPATIENT
Start: 2025-05-22 | End: 2025-06-21

## 2025-05-22 NOTE — PROGRESS NOTES
Name: Lori Cobb  YOB: 1960  Gender: female  MRN: [id number]     05/22/2025: In early to assess her pain and work status     HPI:   05/12/2025: Right foot injury  05/16/2025: Urgent care  05/19/2025: Initial visit: Right foot injury:    ROS/Meds/PSH/PMH/FH/SH: Only reviewed pertinent/relevant information      Tobacco:  has no history on file for tobacco use.     Reviewed Test/Records/Documents:   03/19/2025: [facility]/subsidiary/affiliates: Well adult exam: HTN: GERD: Nonobstructive atherosclerosis of coronary artery: Anxiety: Tobacco dependence: Osteoarthritis multiple joints: Age-related osteoporosis: Atherosclerosis: Vitamin D deficiency    05/16/2025: [facility]: Right foot pain: Right toe pain: 1 week duration: Diagnosed contusion: Right foot pain:  05/16/2025: [facility] urgent care: Right foot radiographs: Radiology impression:  There is an oblique mildly displaced fracture of the mid to distal proximal 4th phalanx  There is no periarticular erosion or significant arthrosis  The soft tissues are unremarkable  [facility] Urgent care radiographs reviewed: My review: Displaced intra-articular PIP joint 4th toe proximal phalanx fracture: More probable distal 3rd toe oblique proximal phalanx fracture    Physical Examination:  Patient appears to be alert and oriented with acceptable appearance.  No obvious distress or SOB  CV: Right foot = excellent foot/toe vascular flow, color, capillary refill   Neuro: Right foot = intact light touch sensation   Skin: Right foot = 4th toe swelling; dry webspace; no redness  MS: Standing: Plantigrade: 4th curly toe but no overlapping, rotary or angular deformity: Gait postop shoe  Right = mainly 4th toe proximal phalanx PIP area pain  Right = 1-5 toe neuralgia  Right = no metatarsal, tarsal, hindfoot, ankle pain     XR: Right: Standing AP lateral oblique foot taken today with arthritic midfoot; suspected old healed base 1st metatarsal fracture; displaced

## 2025-05-23 ENCOUNTER — TELEPHONE (OUTPATIENT)
Dept: ORTHOPEDIC SURGERY | Age: 65
End: 2025-05-23

## 2025-05-23 ENCOUNTER — CLINICAL DOCUMENTATION (OUTPATIENT)
Dept: ORTHOPEDIC SURGERY | Age: 65
End: 2025-05-23

## 2025-05-23 DIAGNOSIS — M79.89 SWELLING OF RIGHT FOOT: Primary | ICD-10-CM

## 2025-05-23 NOTE — PROGRESS NOTES
05/23/2025: Patient messaged with concern of foot swelling  05/23/2025: Elaina discussed Epsom salt soaks and duplex ultrasound.  Patient elected to proceed with duplex ultrasound to rule out DVT    05/23/2025: [facility]: Right lower extremity venous ultrasound: Radiology impression:  No evidence of deep venous thrombosis in the right lower extremity     05/23/2025:  I called to discuss the Duplex U/S read as no evidence of DVT  We discussed protection and elevation  She will call if she has any concerns or problems.

## 2025-05-23 NOTE — TELEPHONE ENCOUNTER
Returning pt call , Pt was instructed to soak her foot in epsom salt and a ultrasound was scheduled at [facility] for 2:30 pm today.

## 2025-06-02 ENCOUNTER — OFFICE VISIT (OUTPATIENT)
Dept: ORTHOPEDIC SURGERY | Age: 65
End: 2025-06-02
Payer: COMMERCIAL

## 2025-06-02 ENCOUNTER — TELEPHONE (OUTPATIENT)
Dept: ORTHOPEDIC SURGERY | Age: 65
End: 2025-06-02

## 2025-06-02 DIAGNOSIS — S92.511G CLOSED DISPLACED FRACTURE OF PROXIMAL PHALANX OF LESSER TOE OF RIGHT FOOT WITH DELAYED HEALING, SUBSEQUENT ENCOUNTER: Primary | ICD-10-CM

## 2025-06-02 DIAGNOSIS — M79.671 RIGHT FOOT PAIN: ICD-10-CM

## 2025-06-02 PROCEDURE — 99213 OFFICE O/P EST LOW 20 MIN: CPT | Performed by: ORTHOPAEDIC SURGERY

## 2025-06-02 PROCEDURE — 1123F ACP DISCUSS/DSCN MKR DOCD: CPT | Performed by: ORTHOPAEDIC SURGERY

## 2025-06-02 NOTE — PROGRESS NOTES
Name: Lori Cobb  YOB: 1960  Gender: female  MRN: [id number]     05/22/2025: In early to assess her pain and work status   05/23/2025: Patient messaged with concern of foot swelling  05/23/2025: Elaina discussed Epsom salt soaks and duplex ultrasound.  Patient elected to proceed with duplex ultrasound to rule out DVT    05/23/2025:  I called to discuss the Duplex U/S read as no evidence of DVT  We discussed protection and elevation  She will call if she has any concerns or problems.    06/01/2025: She reports falling out of her post-op shoe   06/02/2025: Patient messaged about concerns about feeling her toes: She is seen today as a work-in    HPI:   05/12/2025: Right foot injury  05/16/2025: Urgent care  05/19/2025: Initial visit: Right foot injury:    ROS/Meds/PSH/PMH/FH/SH: Only reviewed pertinent/relevant information      Tobacco:  has no history on file for tobacco use.     Reviewed Test/Records/Documents:   03/19/2025: [facility]/subsidiary/affiliates: Well adult exam: HTN: GERD: Nonobstructive atherosclerosis of coronary artery: Anxiety: Tobacco dependence: Osteoarthritis multiple joints: Age-related osteoporosis: Atherosclerosis: Vitamin D deficiency    05/16/2025: [facility] urgent care: Right foot pain: Right toe pain: 1 week duration: Diagnosed contusion: Right foot pain:  05/16/2025: [facility] urgent care: Right foot radiographs: Radiology impression:  There is an oblique mildly displaced fracture of the mid to distal proximal 4th phalanx  There is no periarticular erosion or significant arthrosis  The soft tissues are unremarkable  [facility] Urgent care radiographs reviewed: My review: Displaced intra-articular PIP joint 4th toe proximal phalanx fracture: More probable distal 3rd toe oblique proximal phalanx fracture    05/23/2025: [facility]: Right lower extremity venous ultrasound: Radiology impression:  No evidence of deep venous thrombosis in the right lower extremity

## 2025-06-18 RX ORDER — GABAPENTIN 100 MG/1
CAPSULE ORAL
Qty: 90 CAPSULE | Refills: 0 | OUTPATIENT
Start: 2025-06-18

## 2025-06-23 ENCOUNTER — OFFICE VISIT (OUTPATIENT)
Dept: ORTHOPEDIC SURGERY | Age: 65
End: 2025-06-23
Payer: COMMERCIAL

## 2025-06-23 DIAGNOSIS — S92.511G CLOSED DISPLACED FRACTURE OF PROXIMAL PHALANX OF LESSER TOE OF RIGHT FOOT WITH DELAYED HEALING, SUBSEQUENT ENCOUNTER: Primary | ICD-10-CM

## 2025-06-23 DIAGNOSIS — M79.671 RIGHT FOOT PAIN: ICD-10-CM

## 2025-06-23 PROCEDURE — 1123F ACP DISCUSS/DSCN MKR DOCD: CPT | Performed by: ORTHOPAEDIC SURGERY

## 2025-06-23 PROCEDURE — 99212 OFFICE O/P EST SF 10 MIN: CPT | Performed by: ORTHOPAEDIC SURGERY

## 2025-06-23 NOTE — PROGRESS NOTES
Name: Lori Cobb  YOB: 1960  Gender: female  MRN: [id number]     05/22/2025: In early to assess her pain and work status   05/23/2025: Patient messaged with concern of foot swelling  05/23/2025: Elaina discussed Epsom salt soaks and duplex ultrasound.  Patient elected to proceed with duplex ultrasound to rule out DVT    05/23/2025:  I called to discuss the Duplex U/S read as no evidence of DVT  We discussed protection and elevation  She will call if she has any concerns or problems.    06/01/2025: She reports falling out of her post-op shoe   06/02/2025: Patient messaged about concerns about feeling her toes: She was seen as a work-in    06/23/2025: No new concerns    HPI:   05/12/2025: Right foot injury  05/16/2025: Urgent care  05/19/2025: Initial visit: Right foot injury:    ROS/Meds/PSH/PMH/FH/SH: Only reviewed pertinent/relevant information      Tobacco:  has no history on file for tobacco use.     Reviewed Test/Records/Documents:   03/19/2025: [facility]/subsidiary/affiliates: Well adult exam: HTN: GERD: Nonobstructive atherosclerosis of coronary artery: Anxiety: Tobacco dependence: Osteoarthritis multiple joints: Age-related osteoporosis: Atherosclerosis: Vitamin D deficiency    05/16/2025: [facility]: Right foot pain: Right toe pain: 1 week duration: Diagnosed contusion: Right foot pain:  05/16/2025: [facility] urgent care: Right foot radiographs: Radiology impression:  There is an oblique mildly displaced fracture of the mid to distal proximal 4th phalanx  There is no periarticular erosion or significant arthrosis  The soft tissues are unremarkable  [facility] Urgent care radiographs reviewed: My review: Displaced intra-articular PIP joint 4th toe proximal phalanx fracture: More probable distal 3rd toe oblique proximal phalanx fracture    05/23/2025: [facility]: Right lower extremity venous ultrasound: Radiology impression:  No evidence of deep venous thrombosis in the right lower extremity

## 2025-07-30 ENCOUNTER — TELEPHONE (OUTPATIENT)
Dept: ORTHOPEDIC SURGERY | Age: 65
End: 2025-07-30

## 2025-08-18 ENCOUNTER — PATIENT OUTREACH (OUTPATIENT)
Dept: ADMINISTRATIVE | Facility: HOSPITAL | Age: 65
End: 2025-08-18
Payer: COMMERCIAL